# Patient Record
Sex: FEMALE | Race: WHITE | Employment: FULL TIME | URBAN - METROPOLITAN AREA
[De-identification: names, ages, dates, MRNs, and addresses within clinical notes are randomized per-mention and may not be internally consistent; named-entity substitution may affect disease eponyms.]

---

## 2017-04-11 RX ORDER — CYCLOBENZAPRINE HCL 10 MG
10 TABLET ORAL DAILY
Qty: 30 TABLET | Refills: 2 | Status: SHIPPED | OUTPATIENT
Start: 2017-04-11 | End: 2017-12-30

## 2017-04-12 RX ORDER — SITAGLIPTIN 50 MG/1
TABLET, FILM COATED ORAL
Qty: 60 TABLET | Refills: 3 | Status: SHIPPED | OUTPATIENT
Start: 2017-04-12 | End: 2017-11-02 | Stop reason: SDUPTHER

## 2017-06-09 DIAGNOSIS — E55.9 VITAMIN D DEFICIENCY: ICD-10-CM

## 2017-06-12 ENCOUNTER — HOSPITAL ENCOUNTER (OUTPATIENT)
Age: 37
Setting detail: SPECIMEN
Discharge: HOME | End: 2017-06-12
Payer: COMMERCIAL

## 2017-06-12 ENCOUNTER — OFFICE VISIT (OUTPATIENT)
Dept: FAMILY MEDICINE CLINIC | Age: 37
End: 2017-06-12
Payer: COMMERCIAL

## 2017-06-12 VITALS
DIASTOLIC BLOOD PRESSURE: 85 MMHG | BODY MASS INDEX: 36.32 KG/M2 | WEIGHT: 226 LBS | OXYGEN SATURATION: 97 % | RESPIRATION RATE: 16 BRPM | SYSTOLIC BLOOD PRESSURE: 126 MMHG | HEIGHT: 66 IN | HEART RATE: 83 BPM

## 2017-06-12 DIAGNOSIS — R06.02 SOB (SHORTNESS OF BREATH): ICD-10-CM

## 2017-06-12 DIAGNOSIS — J30.2 SEASONAL ALLERGIC RHINITIS, UNSPECIFIED ALLERGIC RHINITIS TRIGGER: ICD-10-CM

## 2017-06-12 LAB
-: NORMAL
ABSOLUTE EOS #: 0.1 K/UL (ref 0–0.4)
ABSOLUTE LYMPH #: 1.9 K/UL (ref 1–4.8)
ABSOLUTE MONO #: 0.3 K/UL (ref 0.1–1.2)
ALBUMIN SERPL-MCNC: 3.7 G/DL (ref 3.5–5.2)
ALBUMIN/GLOBULIN RATIO: 0.9 (ref 1–2.5)
ALP BLD-CCNC: 70 U/L (ref 35–104)
ALT SERPL-CCNC: 66 U/L (ref 5–33)
AMORPHOUS: NORMAL
ANION GAP SERPL CALCULATED.3IONS-SCNC: 15 MMOL/L (ref 9–17)
AST SERPL-CCNC: 52 U/L
BACTERIA: NORMAL
BASOPHILS # BLD: 1 %
BASOPHILS ABSOLUTE: 0 K/UL (ref 0–0.2)
BILIRUB SERPL-MCNC: 0.53 MG/DL (ref 0.3–1.2)
BILIRUBIN URINE: ABNORMAL
BUN BLDV-MCNC: 7 MG/DL (ref 6–20)
BUN/CREAT BLD: ABNORMAL (ref 9–20)
CALCIUM SERPL-MCNC: 9.2 MG/DL (ref 8.6–10.4)
CASTS UA: NORMAL /LPF (ref 0–8)
CHLORIDE BLD-SCNC: 104 MMOL/L (ref 98–107)
CHOLESTEROL/HDL RATIO: 3.8
CHOLESTEROL: 187 MG/DL
CO2: 19 MMOL/L (ref 20–31)
COLOR: ABNORMAL
COMMENT UA: ABNORMAL
CREAT SERPL-MCNC: 0.59 MG/DL (ref 0.5–0.9)
CREATININE URINE: 449.8 MG/DL (ref 28–217)
CRYSTALS, UA: NORMAL /HPF
DIFFERENTIAL TYPE: NORMAL
EOSINOPHILS RELATIVE PERCENT: 2 %
EPITHELIAL CELLS UA: NORMAL /HPF (ref 0–5)
GFR AFRICAN AMERICAN: >60 ML/MIN
GFR NON-AFRICAN AMERICAN: >60 ML/MIN
GFR SERPL CREATININE-BSD FRML MDRD: ABNORMAL ML/MIN/{1.73_M2}
GFR SERPL CREATININE-BSD FRML MDRD: ABNORMAL ML/MIN/{1.73_M2}
GLUCOSE BLD-MCNC: 221 MG/DL (ref 70–99)
GLUCOSE URINE: ABNORMAL
HBA1C MFR BLD: 9.3 %
HCT VFR BLD CALC: 40.5 % (ref 36–46)
HDLC SERPL-MCNC: 49 MG/DL
HEMOGLOBIN: 13.6 G/DL (ref 12–16)
KETONES, URINE: ABNORMAL
LDL CHOLESTEROL: 114 MG/DL (ref 0–130)
LEUKOCYTE ESTERASE, URINE: NEGATIVE
LYMPHOCYTES # BLD: 29 %
MCH RBC QN AUTO: 30.7 PG (ref 26–34)
MCHC RBC AUTO-ENTMCNC: 33.6 G/DL (ref 31–37)
MCV RBC AUTO: 91.5 FL (ref 80–100)
MICROALBUMIN/CREAT 24H UR: 49 MG/L
MICROALBUMIN/CREAT UR-RTO: 11 MCG/MG CREAT
MONOCYTES # BLD: 4 %
MUCUS: NORMAL
NITRITE, URINE: POSITIVE
OTHER OBSERVATIONS UA: NORMAL
PDW BLD-RTO: 13.8 % (ref 12.5–15.4)
PH UA: 5.5 (ref 5–8)
PLATELET # BLD: 280 K/UL (ref 140–450)
PLATELET ESTIMATE: NORMAL
PMV BLD AUTO: 9.2 FL (ref 6–12)
POTASSIUM SERPL-SCNC: 3.7 MMOL/L (ref 3.7–5.3)
PROTEIN UA: ABNORMAL
RBC # BLD: 4.43 M/UL (ref 4–5.2)
RBC # BLD: NORMAL 10*6/UL
RBC UA: NORMAL /HPF (ref 0–4)
RENAL EPITHELIAL, UA: NORMAL /HPF
SEG NEUTROPHILS: 64 %
SEGMENTED NEUTROPHILS ABSOLUTE COUNT: 4.2 K/UL (ref 1.8–7.7)
SODIUM BLD-SCNC: 138 MMOL/L (ref 135–144)
SPECIFIC GRAVITY UA: 1.04 (ref 1–1.03)
THYROXINE, FREE: 1.2 NG/DL (ref 0.93–1.7)
TOTAL PROTEIN: 7.6 G/DL (ref 6.4–8.3)
TRICHOMONAS: NORMAL
TRIGL SERPL-MCNC: 122 MG/DL
TSH SERPL DL<=0.05 MIU/L-ACNC: 0.95 MIU/L (ref 0.3–5)
TURBIDITY: ABNORMAL
URINE HGB: NEGATIVE
UROBILINOGEN, URINE: NORMAL
VLDLC SERPL CALC-MCNC: NORMAL MG/DL (ref 1–30)
WBC # BLD: 6.5 K/UL (ref 3.5–11)
WBC # BLD: NORMAL 10*3/UL
WBC UA: NORMAL /HPF (ref 0–5)
YEAST: NORMAL

## 2017-06-12 PROCEDURE — 99214 OFFICE O/P EST MOD 30 MIN: CPT | Performed by: FAMILY MEDICINE

## 2017-06-12 PROCEDURE — 83036 HEMOGLOBIN GLYCOSYLATED A1C: CPT | Performed by: FAMILY MEDICINE

## 2017-06-12 RX ORDER — ERGOCALCIFEROL 1.25 MG/1
CAPSULE ORAL
Qty: 4 CAPSULE | Refills: 2 | Status: SHIPPED | OUTPATIENT
Start: 2017-06-12 | End: 2017-11-02 | Stop reason: SDUPTHER

## 2017-06-12 RX ORDER — BLOOD-GLUCOSE METER
1 KIT MISCELLANEOUS DAILY
Qty: 1 KIT | Refills: 0 | Status: SHIPPED | OUTPATIENT
Start: 2017-06-12

## 2017-06-12 RX ORDER — GLUCOSAM/CHON-MSM1/C/MANG/BOSW 500-416.6
1 TABLET ORAL DAILY
Qty: 100 EACH | Refills: 3 | Status: SHIPPED | OUTPATIENT
Start: 2017-06-12 | End: 2018-04-24 | Stop reason: SDUPTHER

## 2017-06-12 RX ORDER — FLUTICASONE PROPIONATE 50 MCG
1 SPRAY, SUSPENSION (ML) NASAL DAILY
Qty: 1 BOTTLE | Refills: 3 | Status: SHIPPED | OUTPATIENT
Start: 2017-06-12 | End: 2018-04-24 | Stop reason: SDUPTHER

## 2017-06-12 RX ORDER — CETIRIZINE HYDROCHLORIDE 10 MG/1
10 TABLET ORAL DAILY
Qty: 30 TABLET | Refills: 2 | Status: SHIPPED | OUTPATIENT
Start: 2017-06-12 | End: 2017-11-02 | Stop reason: SDUPTHER

## 2017-06-14 LAB — THYROID PEROXIDASE (TPO) AB: 13.7 IU/ML (ref 0–35)

## 2017-06-20 ENCOUNTER — APPOINTMENT (OUTPATIENT)
Dept: CT IMAGING | Age: 37
End: 2017-06-20
Payer: COMMERCIAL

## 2017-06-20 ENCOUNTER — HOSPITAL ENCOUNTER (EMERGENCY)
Age: 37
Discharge: HOME | End: 2017-06-20
Attending: EMERGENCY MEDICINE
Payer: COMMERCIAL

## 2017-06-20 VITALS
TEMPERATURE: 98.4 F | WEIGHT: 226.5 LBS | HEART RATE: 84 BPM | HEIGHT: 66 IN | SYSTOLIC BLOOD PRESSURE: 138 MMHG | RESPIRATION RATE: 16 BRPM | BODY MASS INDEX: 36.4 KG/M2 | DIASTOLIC BLOOD PRESSURE: 68 MMHG | OXYGEN SATURATION: 97 %

## 2017-06-20 DIAGNOSIS — M79.10 MYALGIA: Primary | ICD-10-CM

## 2017-06-20 LAB
ABSOLUTE EOS #: 0.1 K/UL (ref 0–0.4)
ABSOLUTE LYMPH #: 2 K/UL (ref 1–4.8)
ABSOLUTE MONO #: 0.3 K/UL (ref 0.2–0.8)
ALBUMIN SERPL-MCNC: 3.6 G/DL (ref 3.5–5.2)
ALBUMIN/GLOBULIN RATIO: ABNORMAL (ref 1–2.5)
ALP BLD-CCNC: 53 U/L (ref 35–104)
ALT SERPL-CCNC: 51 U/L (ref 5–33)
ANION GAP SERPL CALCULATED.3IONS-SCNC: 14 MMOL/L (ref 9–17)
AST SERPL-CCNC: 45 U/L
BASOPHILS # BLD: 0 %
BASOPHILS ABSOLUTE: 0 K/UL (ref 0–0.2)
BILIRUB SERPL-MCNC: 0.33 MG/DL (ref 0.3–1.2)
BUN BLDV-MCNC: 10 MG/DL (ref 6–20)
BUN/CREAT BLD: 15 (ref 9–20)
CALCIUM SERPL-MCNC: 8.8 MG/DL (ref 8.6–10.4)
CHLORIDE BLD-SCNC: 103 MMOL/L (ref 98–107)
CO2: 19 MMOL/L (ref 20–31)
CREAT SERPL-MCNC: 0.67 MG/DL (ref 0.5–0.9)
D-DIMER QUANTITATIVE: 0.85 MG/L FEU
DIFFERENTIAL TYPE: NORMAL
EOSINOPHILS RELATIVE PERCENT: 1 %
GFR AFRICAN AMERICAN: >60 ML/MIN
GFR NON-AFRICAN AMERICAN: >60 ML/MIN
GFR SERPL CREATININE-BSD FRML MDRD: ABNORMAL ML/MIN/{1.73_M2}
GFR SERPL CREATININE-BSD FRML MDRD: ABNORMAL ML/MIN/{1.73_M2}
GLUCOSE BLD-MCNC: 161 MG/DL (ref 65–105)
GLUCOSE BLD-MCNC: 164 MG/DL (ref 70–99)
HCT VFR BLD CALC: 37.6 % (ref 36–46)
HEMOGLOBIN: 12.8 G/DL (ref 12–16)
INR BLD: 1
LYMPHOCYTES # BLD: 32 %
MCH RBC QN AUTO: 31.2 PG (ref 26–34)
MCHC RBC AUTO-ENTMCNC: 34 G/DL (ref 31–37)
MCV RBC AUTO: 91.8 FL (ref 80–100)
MONOCYTES # BLD: 6 %
PARTIAL THROMBOPLASTIN TIME: 25.4 SEC (ref 23–31)
PDW BLD-RTO: 12.9 % (ref 11.5–14.5)
PLATELET # BLD: 248 K/UL (ref 130–400)
PLATELET ESTIMATE: NORMAL
PMV BLD AUTO: 8.1 FL (ref 6–12)
POTASSIUM SERPL-SCNC: 3.7 MMOL/L (ref 3.7–5.3)
PROTHROMBIN TIME: 10.6 SEC (ref 9.7–11.6)
RBC # BLD: 4.1 M/UL (ref 4–5.2)
RBC # BLD: NORMAL 10*6/UL
SEG NEUTROPHILS: 61 %
SEGMENTED NEUTROPHILS ABSOLUTE COUNT: 3.8 K/UL (ref 1.8–7.7)
SODIUM BLD-SCNC: 136 MMOL/L (ref 135–144)
TOTAL PROTEIN: 6.9 G/DL (ref 6.4–8.3)
TROPONIN INTERP: NORMAL
TROPONIN T: <0.03 NG/ML
WBC # BLD: 6.2 K/UL (ref 3.5–11)
WBC # BLD: NORMAL 10*3/UL

## 2017-06-20 PROCEDURE — 99284 EMERGENCY DEPT VISIT MOD MDM: CPT

## 2017-06-20 PROCEDURE — 93971 EXTREMITY STUDY: CPT

## 2017-06-20 PROCEDURE — 85610 PROTHROMBIN TIME: CPT

## 2017-06-20 PROCEDURE — 85730 THROMBOPLASTIN TIME PARTIAL: CPT

## 2017-06-20 PROCEDURE — 85379 FIBRIN DEGRADATION QUANT: CPT

## 2017-06-20 PROCEDURE — 85025 COMPLETE CBC W/AUTO DIFF WBC: CPT

## 2017-06-20 PROCEDURE — 80053 COMPREHEN METABOLIC PANEL: CPT

## 2017-06-20 PROCEDURE — 82947 ASSAY GLUCOSE BLOOD QUANT: CPT

## 2017-06-20 PROCEDURE — 93005 ELECTROCARDIOGRAM TRACING: CPT

## 2017-06-20 PROCEDURE — 84484 ASSAY OF TROPONIN QUANT: CPT

## 2017-06-20 PROCEDURE — 70450 CT HEAD/BRAIN W/O DYE: CPT

## 2017-06-20 RX ORDER — OXYCODONE HYDROCHLORIDE AND ACETAMINOPHEN 5; 325 MG/1; MG/1
1-2 TABLET ORAL EVERY 6 HOURS PRN
Qty: 20 TABLET | Refills: 0 | Status: SHIPPED | OUTPATIENT
Start: 2017-06-20 | End: 2017-06-26 | Stop reason: ALTCHOICE

## 2017-06-20 ASSESSMENT — PAIN SCALES - GENERAL: PAINLEVEL_OUTOF10: 5

## 2017-06-20 ASSESSMENT — PAIN DESCRIPTION - ORIENTATION: ORIENTATION: LEFT

## 2017-06-21 LAB
EKG ATRIAL RATE: 92 BPM
EKG P AXIS: 40 DEGREES
EKG P-R INTERVAL: 132 MS
EKG Q-T INTERVAL: 362 MS
EKG QRS DURATION: 84 MS
EKG QTC CALCULATION (BAZETT): 447 MS
EKG R AXIS: 25 DEGREES
EKG T AXIS: 54 DEGREES
EKG VENTRICULAR RATE: 92 BPM

## 2017-06-26 ENCOUNTER — OFFICE VISIT (OUTPATIENT)
Dept: FAMILY MEDICINE CLINIC | Age: 37
End: 2017-06-26
Payer: COMMERCIAL

## 2017-06-26 VITALS
WEIGHT: 228 LBS | DIASTOLIC BLOOD PRESSURE: 83 MMHG | HEIGHT: 66 IN | RESPIRATION RATE: 14 BRPM | BODY MASS INDEX: 36.64 KG/M2 | HEART RATE: 94 BPM | SYSTOLIC BLOOD PRESSURE: 121 MMHG | OXYGEN SATURATION: 98 %

## 2017-06-26 PROCEDURE — 99213 OFFICE O/P EST LOW 20 MIN: CPT | Performed by: FAMILY MEDICINE

## 2017-06-26 RX ORDER — BLOOD-GLUCOSE METER
KIT MISCELLANEOUS
Refills: 0 | COMMUNITY
Start: 2017-06-13

## 2017-08-28 ENCOUNTER — OFFICE VISIT (OUTPATIENT)
Dept: FAMILY MEDICINE CLINIC | Age: 37
End: 2017-08-28
Payer: COMMERCIAL

## 2017-08-28 VITALS
HEIGHT: 66 IN | DIASTOLIC BLOOD PRESSURE: 79 MMHG | HEART RATE: 91 BPM | BODY MASS INDEX: 35.03 KG/M2 | OXYGEN SATURATION: 100 % | WEIGHT: 218 LBS | SYSTOLIC BLOOD PRESSURE: 103 MMHG

## 2017-08-28 PROCEDURE — 99213 OFFICE O/P EST LOW 20 MIN: CPT | Performed by: FAMILY MEDICINE

## 2017-11-01 ENCOUNTER — HOSPITAL ENCOUNTER (OUTPATIENT)
Age: 37
Setting detail: SPECIMEN
Discharge: HOME | End: 2017-11-01
Payer: COMMERCIAL

## 2017-11-01 ENCOUNTER — NURSE ONLY (OUTPATIENT)
Dept: FAMILY MEDICINE CLINIC | Age: 37
End: 2017-11-01
Payer: COMMERCIAL

## 2017-11-01 DIAGNOSIS — R10.9 FLANK PAIN: ICD-10-CM

## 2017-11-01 DIAGNOSIS — R30.0 DYSURIA: Primary | ICD-10-CM

## 2017-11-01 DIAGNOSIS — R39.9 UTI SYMPTOMS: ICD-10-CM

## 2017-11-01 LAB
BILIRUBIN URINE: NEGATIVE
BILIRUBIN, POC: NORMAL
BLOOD URINE, POC: NEGATIVE
CLARITY, POC: CLEAR
COLOR, POC: YELLOW
COLOR: YELLOW
COMMENT UA: ABNORMAL
GLUCOSE URINE, POC: ABNORMAL
GLUCOSE URINE: ABNORMAL
KETONES, POC: ABNORMAL
KETONES, URINE: ABNORMAL
LEUKOCYTE EST, POC: NEGATIVE
LEUKOCYTE ESTERASE, URINE: NEGATIVE
NITRITE, POC: NEGATIVE
NITRITE, URINE: NEGATIVE
PH UA: 5 (ref 5–8)
PH, POC: 5
PROTEIN UA: NEGATIVE
PROTEIN, POC: ABNORMAL
SPECIFIC GRAVITY UA: 1.02 (ref 1–1.03)
SPECIFIC GRAVITY, POC: 1.02
TURBIDITY: CLEAR
URINE HGB: NEGATIVE
UROBILINOGEN, POC: NORMAL
UROBILINOGEN, URINE: NORMAL

## 2017-11-01 PROCEDURE — 81002 URINALYSIS NONAUTO W/O SCOPE: CPT | Performed by: FAMILY MEDICINE

## 2017-11-02 LAB
CULTURE: NORMAL
CULTURE: NORMAL
Lab: NORMAL
SPECIMEN DESCRIPTION: NORMAL
STATUS: NORMAL

## 2017-12-30 ENCOUNTER — HOSPITAL ENCOUNTER (EMERGENCY)
Facility: CLINIC | Age: 37
Discharge: HOME | End: 2017-12-30
Attending: EMERGENCY MEDICINE
Payer: COMMERCIAL

## 2017-12-30 ENCOUNTER — APPOINTMENT (OUTPATIENT)
Dept: GENERAL RADIOLOGY | Facility: CLINIC | Age: 37
End: 2017-12-30
Payer: COMMERCIAL

## 2017-12-30 VITALS
OXYGEN SATURATION: 97 % | TEMPERATURE: 98.3 F | BODY MASS INDEX: 34.23 KG/M2 | HEIGHT: 66 IN | RESPIRATION RATE: 17 BRPM | HEART RATE: 91 BPM | WEIGHT: 213 LBS | SYSTOLIC BLOOD PRESSURE: 143 MMHG | DIASTOLIC BLOOD PRESSURE: 87 MMHG

## 2017-12-30 DIAGNOSIS — J20.9 ACUTE BRONCHITIS, UNSPECIFIED ORGANISM: Primary | ICD-10-CM

## 2017-12-30 LAB
BILIRUBIN URINE: NEGATIVE
COLOR: YELLOW
COMMENT UA: ABNORMAL
GLUCOSE URINE: ABNORMAL
KETONES, URINE: NEGATIVE
LEUKOCYTE ESTERASE, URINE: NEGATIVE
NITRITE, URINE: NEGATIVE
PH UA: 5.5 (ref 5–8)
PROTEIN UA: NEGATIVE
SPECIFIC GRAVITY UA: 1.04 (ref 1–1.03)
TURBIDITY: CLEAR
URINE HGB: NEGATIVE
UROBILINOGEN, URINE: NORMAL

## 2017-12-30 PROCEDURE — 71020 XR CHEST STANDARD TWO VW: CPT

## 2017-12-30 PROCEDURE — 87086 URINE CULTURE/COLONY COUNT: CPT

## 2017-12-30 PROCEDURE — 99283 EMERGENCY DEPT VISIT LOW MDM: CPT

## 2017-12-30 PROCEDURE — 81003 URINALYSIS AUTO W/O SCOPE: CPT

## 2017-12-30 RX ORDER — BENZONATATE 200 MG/1
200 CAPSULE ORAL 3 TIMES DAILY PRN
Qty: 30 CAPSULE | Refills: 0 | Status: SHIPPED | OUTPATIENT
Start: 2017-12-30 | End: 2018-01-06

## 2017-12-30 RX ORDER — AZITHROMYCIN 250 MG/1
TABLET, FILM COATED ORAL
Qty: 1 PACKET | Refills: 0 | Status: SHIPPED | OUTPATIENT
Start: 2017-12-30 | End: 2018-01-09

## 2017-12-30 RX ORDER — GUAIFENESIN 400 MG/1
400 TABLET ORAL 4 TIMES DAILY PRN
COMMUNITY
End: 2019-06-10

## 2017-12-30 ASSESSMENT — ENCOUNTER SYMPTOMS
RHINORRHEA: 1
EYE PAIN: 0
NAUSEA: 0
BACK PAIN: 0
COUGH: 1
ABDOMINAL PAIN: 0
BLOOD IN STOOL: 0
CONSTIPATION: 0
DIARRHEA: 0
VOMITING: 0
SHORTNESS OF BREATH: 0

## 2017-12-30 NOTE — ED PROVIDER NOTES
clots; and Vitamin D deficiency. SURGICAL HISTORY      has a past surgical history that includes Tubal ligation (03/2009); Tonsillectomy; Cervix lesion destruction (1998); and Hysterectomy (09/15/2016). CURRENT MEDICATIONS       Previous Medications    ALBUTEROL SULFATE (PROAIR RESPICLICK) 105 (90 BASE) MCG/ACT AEROSOL POWDER INHALATION    Inhale 1 puff into the lungs every 6 hours as needed for Wheezing or Shortness of Breath    BLOOD GLUCOSE MONITORING SUPPL (FREESTYLE LITE) EDUARDO        BLOOD GLUCOSE MONITORING SUPPL (TRUERESULT BLOOD GLUCOSE) W/DEVICE KIT    1 kit by Does not apply route daily    BYDUREON 2 MG SRER INJECTION    inject 1 SYRINGE INTO THE SKIN every week    CETIRIZINE (ZYRTEC) 10 MG TABLET    take 1 tablet by mouth once daily    DAPAGLIFLOZIN (FARXIGA) 10 MG TABLET    Take 1 tablet by mouth every morning    FLUTICASONE (FLONASE) 50 MCG/ACT NASAL SPRAY    1 spray by Nasal route daily    GLUCOSE BLOOD VI TEST STRIPS (TRUETEST TEST) STRIP    1 each by In Vitro route daily As needed. GUAIFENESIN 400 MG TABLET    Take 400 mg by mouth 4 times daily as needed for Cough    IBUPROFEN (ADVIL;MOTRIN) 800 MG TABLET    Take 1 tablet by mouth every 8 hours as needed for Pain    JANUVIA 50 MG TABLET    take 2 tablets by mouth once daily    LISINOPRIL (PRINIVIL;ZESTRIL) 5 MG TABLET    take 1 tablet by mouth once daily    METFORMIN (GLUCOPHAGE-XR) 500 MG EXTENDED RELEASE TABLET    take 2 tablets by mouth twice a day    SIMVASTATIN (ZOCOR) 40 MG TABLET    take 1 tablet by mouth every evening    TRUEPLUS LANCETS 30G MISC    1 each by Does not apply route daily    VITAMIN D (ERGOCALCIFEROL) 52083 UNITS CAPS CAPSULE    take 1 capsule by mouth every week       ALLERGIES     is allergic to codeine; keflex [cephalexin]; penicillins; and vicodin [hydrocodone-acetaminophen]. FAMILY HISTORY     indicated that the status of her mother is unknown.  She indicated that the status of her father is unknown.      family film images such as CT, Ultrasound and MRI are read by the radiologist. Plain radiographic images are visualized and the radiologist interpretations are reviewed as follows:      TWO VIEWS OF THE CHEST       12/30/2017 8:07 am       COMPARISON:   None.       HISTORY:   ORDERING SYSTEM PROVIDED HISTORY: cough   TECHNOLOGIST PROVIDED HISTORY:   Reason for exam:->cough   Ordering Physician Provided Reason for Exam: pt c/o cough x 2 weeks   Acuity: Acute   Type of Exam: Initial       FINDINGS:   Heart size is normal.  Mild bronchovascular prominence is present centrally   which can be clinically correlated for bronchitis.  No focal consolidation,   effusion, or pneumothorax is noted.  Osseous and mediastinal structures are   otherwise unremarkable.           Impression   Mild central bronchovascular prominence which can be clinically correlated   for bronchitis.  No localized consolidation is seen.               LABS:  Results for orders placed or performed during the hospital encounter of 12/30/17   Urinalysis   Result Value Ref Range    Color, UA YELLOW YEL    Turbidity UA CLEAR CLEAR    Glucose, Ur 3+ (A) NEG    Bilirubin Urine NEGATIVE NEG    Ketones, Urine NEGATIVE NEG    Specific Gravity, UA 1.036 (H) 1.005 - 1.030    Urine Hgb NEGATIVE NEG    pH, UA 5.5 5.0 - 8.0    Protein, UA NEGATIVE NEG    Urobilinogen, Urine Normal NORM    Nitrite, Urine NEGATIVE NEG    Leukocyte Esterase, Urine NEGATIVE NEG    Urinalysis Comments       Microscopic exam not performed based on chemical results unless requested in           [address] [place] [address] [place]:   Vitals:    Vitals:    12/30/17 0752   BP: (!) 143/87   Pulse: 91   Resp: 17   Temp: 98.3 °F (36.8 °C)   TempSrc: Oral   SpO2: 97%   Weight: 96.6 kg (213 lb)   Height: 5' 6\" (1.676 m)     -------------------------  BP: (!) 143/87, Temp: 98.3 °F (36.8 °C), Pulse: 91, Resp: 17          CONSULTS:      PROCEDURES:  None    FINAL IMPRESSION      1.  Acute bronchitis, unspecified

## 2017-12-30 NOTE — ED TRIAGE NOTES
Pt c/o cough and congestion for 2 weeks. Pt states she has also c/o UTI s/s. Pt took Mucinex and MDI Albuterol without effectiveness.

## 2017-12-31 LAB
CULTURE: NORMAL
CULTURE: NORMAL
Lab: NORMAL
SPECIMEN DESCRIPTION: NORMAL
SPECIMEN DESCRIPTION: NORMAL
STATUS: NORMAL

## 2018-04-24 ENCOUNTER — TELEPHONE (OUTPATIENT)
Dept: FAMILY MEDICINE CLINIC | Age: 38
End: 2018-04-24

## 2018-04-24 ENCOUNTER — OFFICE VISIT (OUTPATIENT)
Dept: FAMILY MEDICINE CLINIC | Age: 38
End: 2018-04-24
Payer: COMMERCIAL

## 2018-04-24 VITALS
SYSTOLIC BLOOD PRESSURE: 124 MMHG | DIASTOLIC BLOOD PRESSURE: 83 MMHG | RESPIRATION RATE: 16 BRPM | HEART RATE: 68 BPM | OXYGEN SATURATION: 98 % | BODY MASS INDEX: 35.99 KG/M2 | WEIGHT: 223 LBS

## 2018-04-24 DIAGNOSIS — E55.9 VITAMIN D DEFICIENCY: ICD-10-CM

## 2018-04-24 DIAGNOSIS — H61.22 IMPACTED CERUMEN OF LEFT EAR: ICD-10-CM

## 2018-04-24 DIAGNOSIS — J30.2 SEASONAL ALLERGIC RHINITIS, UNSPECIFIED CHRONICITY, UNSPECIFIED TRIGGER: ICD-10-CM

## 2018-04-24 DIAGNOSIS — Z00.01 ENCOUNTER FOR WELL ADULT EXAM WITH ABNORMAL FINDINGS: Primary | ICD-10-CM

## 2018-04-24 PROCEDURE — 1036F TOBACCO NON-USER: CPT | Performed by: FAMILY MEDICINE

## 2018-04-24 PROCEDURE — 3046F HEMOGLOBIN A1C LEVEL >9.0%: CPT | Performed by: FAMILY MEDICINE

## 2018-04-24 PROCEDURE — 2022F DILAT RTA XM EVC RTNOPTHY: CPT | Performed by: FAMILY MEDICINE

## 2018-04-24 PROCEDURE — 99395 PREV VISIT EST AGE 18-39: CPT | Performed by: FAMILY MEDICINE

## 2018-04-24 PROCEDURE — G8427 DOCREV CUR MEDS BY ELIG CLIN: HCPCS | Performed by: FAMILY MEDICINE

## 2018-04-24 PROCEDURE — G8417 CALC BMI ABV UP PARAM F/U: HCPCS | Performed by: FAMILY MEDICINE

## 2018-04-24 PROCEDURE — 69210 REMOVE IMPACTED EAR WAX UNI: CPT | Performed by: FAMILY MEDICINE

## 2018-04-24 PROCEDURE — 99214 OFFICE O/P EST MOD 30 MIN: CPT | Performed by: FAMILY MEDICINE

## 2018-04-24 RX ORDER — CETIRIZINE HYDROCHLORIDE 10 MG/1
10 TABLET ORAL DAILY
Qty: 30 TABLET | Refills: 3 | Status: SHIPPED | OUTPATIENT
Start: 2018-04-24 | End: 2019-09-30 | Stop reason: SDUPTHER

## 2018-04-24 RX ORDER — ERGOCALCIFEROL 1.25 MG/1
50000 CAPSULE ORAL WEEKLY
Qty: 4 CAPSULE | Refills: 2 | Status: SHIPPED | OUTPATIENT
Start: 2018-04-24 | End: 2019-06-10 | Stop reason: SDUPTHER

## 2018-04-24 RX ORDER — FLUCONAZOLE 150 MG/1
150 TABLET ORAL ONCE
Qty: 1 TABLET | Refills: 1 | Status: SHIPPED | OUTPATIENT
Start: 2018-04-24 | End: 2019-12-24

## 2018-04-24 RX ORDER — FLUTICASONE PROPIONATE 50 MCG
1 SPRAY, SUSPENSION (ML) NASAL DAILY
Qty: 1 BOTTLE | Refills: 3 | Status: SHIPPED | OUTPATIENT
Start: 2018-04-24

## 2018-04-24 RX ORDER — GLUCOSAM/CHON-MSM1/C/MANG/BOSW 500-416.6
1 TABLET ORAL DAILY
Qty: 100 EACH | Refills: 3 | Status: SHIPPED | OUTPATIENT
Start: 2018-04-24 | End: 2020-11-03 | Stop reason: SDUPTHER

## 2018-04-24 RX ORDER — LISINOPRIL 5 MG/1
2.5 TABLET ORAL DAILY
Qty: 15 TABLET | Refills: 3 | Status: SHIPPED | OUTPATIENT
Start: 2018-04-24 | End: 2019-06-10 | Stop reason: SDUPTHER

## 2018-04-24 RX ORDER — SIMVASTATIN 40 MG
40 TABLET ORAL NIGHTLY
Qty: 30 TABLET | Refills: 3 | Status: SHIPPED | OUTPATIENT
Start: 2018-04-24 | End: 2019-06-10 | Stop reason: SDUPTHER

## 2018-04-24 RX ORDER — METFORMIN HYDROCHLORIDE 500 MG/1
500 TABLET, EXTENDED RELEASE ORAL
Qty: 120 TABLET | Refills: 3 | Status: SHIPPED | OUTPATIENT
Start: 2018-04-24 | End: 2018-04-26 | Stop reason: DRUGHIGH

## 2018-04-24 ASSESSMENT — PATIENT HEALTH QUESTIONNAIRE - PHQ9
2. FEELING DOWN, DEPRESSED OR HOPELESS: 0
SUM OF ALL RESPONSES TO PHQ QUESTIONS 1-9: 0
SUM OF ALL RESPONSES TO PHQ9 QUESTIONS 1 & 2: 0
1. LITTLE INTEREST OR PLEASURE IN DOING THINGS: 0

## 2018-04-26 ENCOUNTER — HOSPITAL ENCOUNTER (OUTPATIENT)
Age: 38
Setting detail: SPECIMEN
Discharge: HOME | End: 2018-04-26
Payer: COMMERCIAL

## 2018-04-26 DIAGNOSIS — Z00.01 ENCOUNTER FOR WELL ADULT EXAM WITH ABNORMAL FINDINGS: ICD-10-CM

## 2018-04-26 LAB
ABSOLUTE EOS #: 0.09 K/UL (ref 0–0.44)
ABSOLUTE IMMATURE GRANULOCYTE: <0.03 K/UL (ref 0–0.3)
ABSOLUTE LYMPH #: 2.22 K/UL (ref 1.1–3.7)
ABSOLUTE MONO #: 0.36 K/UL (ref 0.1–1.2)
ALBUMIN SERPL-MCNC: 3.7 G/DL (ref 3.5–5.2)
ALBUMIN/GLOBULIN RATIO: 0.9 (ref 1–2.5)
ALP BLD-CCNC: 87 U/L (ref 35–104)
ALT SERPL-CCNC: 42 U/L (ref 5–33)
ANION GAP SERPL CALCULATED.3IONS-SCNC: 15 MMOL/L (ref 9–17)
AST SERPL-CCNC: 36 U/L
BASOPHILS # BLD: 0 % (ref 0–2)
BASOPHILS ABSOLUTE: <0.03 K/UL (ref 0–0.2)
BILIRUB SERPL-MCNC: 0.3 MG/DL (ref 0.3–1.2)
BILIRUBIN URINE: NEGATIVE
BUN BLDV-MCNC: 11 MG/DL (ref 6–20)
BUN/CREAT BLD: ABNORMAL (ref 9–20)
CALCIUM SERPL-MCNC: 8.8 MG/DL (ref 8.6–10.4)
CHLORIDE BLD-SCNC: 99 MMOL/L (ref 98–107)
CHOLESTEROL/HDL RATIO: 2.9
CHOLESTEROL: 167 MG/DL
CO2: 20 MMOL/L (ref 20–31)
COLOR: YELLOW
COMMENT UA: ABNORMAL
CREAT SERPL-MCNC: 0.57 MG/DL (ref 0.5–0.9)
DIFFERENTIAL TYPE: ABNORMAL
EOSINOPHILS RELATIVE PERCENT: 1 % (ref 1–4)
GFR AFRICAN AMERICAN: >60 ML/MIN
GFR NON-AFRICAN AMERICAN: >60 ML/MIN
GFR SERPL CREATININE-BSD FRML MDRD: ABNORMAL ML/MIN/{1.73_M2}
GFR SERPL CREATININE-BSD FRML MDRD: ABNORMAL ML/MIN/{1.73_M2}
GLUCOSE BLD-MCNC: 301 MG/DL (ref 70–99)
GLUCOSE URINE: ABNORMAL
HCT VFR BLD CALC: 38.2 % (ref 36.3–47.1)
HDLC SERPL-MCNC: 58 MG/DL
HEMOGLOBIN: 12.6 G/DL (ref 11.9–15.1)
IMMATURE GRANULOCYTES: 0 %
KETONES, URINE: NEGATIVE
LDL CHOLESTEROL: 96 MG/DL (ref 0–130)
LEUKOCYTE ESTERASE, URINE: NEGATIVE
LYMPHOCYTES # BLD: 28 % (ref 24–43)
MCH RBC QN AUTO: 31 PG (ref 25.2–33.5)
MCHC RBC AUTO-ENTMCNC: 33 G/DL (ref 28.4–34.8)
MCV RBC AUTO: 93.9 FL (ref 82.6–102.9)
MONOCYTES # BLD: 5 % (ref 3–12)
NITRITE, URINE: NEGATIVE
NRBC AUTOMATED: 0 PER 100 WBC
PDW BLD-RTO: 12.4 % (ref 11.8–14.4)
PH UA: 5 (ref 5–8)
PLATELET # BLD: 272 K/UL (ref 138–453)
PLATELET ESTIMATE: ABNORMAL
PMV BLD AUTO: 11.5 FL (ref 8.1–13.5)
POTASSIUM SERPL-SCNC: 3.9 MMOL/L (ref 3.7–5.3)
PROTEIN UA: NEGATIVE
RBC # BLD: 4.07 M/UL (ref 3.95–5.11)
RBC # BLD: ABNORMAL 10*6/UL
SEG NEUTROPHILS: 66 % (ref 36–65)
SEGMENTED NEUTROPHILS ABSOLUTE COUNT: 5.24 K/UL (ref 1.5–8.1)
SODIUM BLD-SCNC: 134 MMOL/L (ref 135–144)
SPECIFIC GRAVITY UA: 1.03 (ref 1–1.03)
THYROXINE, FREE: 1.23 NG/DL (ref 0.93–1.7)
TOTAL PROTEIN: 7.8 G/DL (ref 6.4–8.3)
TRIGL SERPL-MCNC: 65 MG/DL
TSH SERPL DL<=0.05 MIU/L-ACNC: 1.24 MIU/L (ref 0.3–5)
TURBIDITY: CLEAR
URINE HGB: NEGATIVE
UROBILINOGEN, URINE: NORMAL
VLDLC SERPL CALC-MCNC: NORMAL MG/DL (ref 1–30)
WBC # BLD: 8 K/UL (ref 3.5–11.3)
WBC # BLD: ABNORMAL 10*3/UL

## 2018-04-26 RX ORDER — METFORMIN HYDROCHLORIDE 500 MG/1
TABLET, EXTENDED RELEASE ORAL
Qty: 120 TABLET | Refills: 3 | Status: SHIPPED | OUTPATIENT
Start: 2018-04-26 | End: 2019-06-10 | Stop reason: SDUPTHER

## 2018-04-26 RX ORDER — PEN NEEDLE, DIABETIC 31 G X1/4"
1 NEEDLE, DISPOSABLE MISCELLANEOUS 2 TIMES DAILY
Qty: 100 EACH | Refills: 3 | Status: SHIPPED | OUTPATIENT
Start: 2018-04-26 | End: 2021-04-14

## 2018-04-27 LAB — THYROID PEROXIDASE (TPO) AB: 20.5 IU/ML (ref 0–35)

## 2018-05-03 ENCOUNTER — HOSPITAL ENCOUNTER (OUTPATIENT)
Age: 38
Setting detail: SPECIMEN
Discharge: HOME | End: 2018-05-03
Payer: COMMERCIAL

## 2018-05-03 ENCOUNTER — OFFICE VISIT (OUTPATIENT)
Dept: OBGYN CLINIC | Age: 38
End: 2018-05-03
Payer: COMMERCIAL

## 2018-05-03 VITALS
WEIGHT: 220 LBS | DIASTOLIC BLOOD PRESSURE: 81 MMHG | HEIGHT: 65 IN | HEART RATE: 90 BPM | BODY MASS INDEX: 36.65 KG/M2 | SYSTOLIC BLOOD PRESSURE: 127 MMHG

## 2018-05-03 DIAGNOSIS — Z12.31 ENCOUNTER FOR SCREENING MAMMOGRAM FOR MALIGNANT NEOPLASM OF BREAST: ICD-10-CM

## 2018-05-03 DIAGNOSIS — B37.31 VAGINAL YEAST INFECTION: ICD-10-CM

## 2018-05-03 DIAGNOSIS — Z80.41 FAMILY HISTORY OF OVARIAN CANCER: ICD-10-CM

## 2018-05-03 DIAGNOSIS — Z90.710 S/P HYSTERECTOMY: Primary | ICD-10-CM

## 2018-05-03 DIAGNOSIS — Z01.419 ENCOUNTER FOR WELL WOMAN EXAM: ICD-10-CM

## 2018-05-03 DIAGNOSIS — Z80.3 FAMILY HISTORY OF BREAST CANCER: ICD-10-CM

## 2018-05-03 LAB
DIRECT EXAM: ABNORMAL
Lab: ABNORMAL
SPECIMEN DESCRIPTION: ABNORMAL
STATUS: ABNORMAL

## 2018-05-03 PROCEDURE — 99395 PREV VISIT EST AGE 18-39: CPT | Performed by: ADVANCED PRACTICE MIDWIFE

## 2018-05-04 DIAGNOSIS — B96.89 BV (BACTERIAL VAGINOSIS): Primary | ICD-10-CM

## 2018-05-04 DIAGNOSIS — N76.0 BV (BACTERIAL VAGINOSIS): Primary | ICD-10-CM

## 2018-05-04 RX ORDER — METRONIDAZOLE 500 MG/1
500 TABLET ORAL 2 TIMES DAILY
Qty: 14 TABLET | Refills: 0 | Status: SHIPPED | OUTPATIENT
Start: 2018-05-04 | End: 2018-07-16 | Stop reason: SDUPTHER

## 2018-06-02 PROBLEM — Z01.419 ENCOUNTER FOR WELL WOMAN EXAM: Status: RESOLVED | Noted: 2018-05-03 | Resolved: 2018-06-02

## 2018-06-02 PROBLEM — Z12.31 ENCOUNTER FOR SCREENING MAMMOGRAM FOR MALIGNANT NEOPLASM OF BREAST: Status: RESOLVED | Noted: 2018-05-03 | Resolved: 2018-06-02

## 2018-07-16 DIAGNOSIS — N76.0 BV (BACTERIAL VAGINOSIS): ICD-10-CM

## 2018-07-16 DIAGNOSIS — B96.89 BV (BACTERIAL VAGINOSIS): ICD-10-CM

## 2018-07-16 RX ORDER — METRONIDAZOLE 500 MG/1
500 TABLET ORAL 2 TIMES DAILY
Qty: 14 TABLET | Refills: 0 | Status: SHIPPED | OUTPATIENT
Start: 2018-07-16 | End: 2018-10-15 | Stop reason: SDUPTHER

## 2018-09-11 ENCOUNTER — TELEPHONE (OUTPATIENT)
Dept: FAMILY MEDICINE CLINIC | Age: 38
End: 2018-09-11

## 2018-10-15 DIAGNOSIS — B96.89 BV (BACTERIAL VAGINOSIS): ICD-10-CM

## 2018-10-15 DIAGNOSIS — N76.0 BV (BACTERIAL VAGINOSIS): ICD-10-CM

## 2018-10-15 RX ORDER — METRONIDAZOLE 500 MG/1
500 TABLET ORAL 2 TIMES DAILY
Qty: 14 TABLET | Refills: 0 | Status: SHIPPED | OUTPATIENT
Start: 2018-10-15 | End: 2018-10-22

## 2019-06-10 ENCOUNTER — OFFICE VISIT (OUTPATIENT)
Dept: FAMILY MEDICINE CLINIC | Age: 39
End: 2019-06-10
Payer: COMMERCIAL

## 2019-06-10 VITALS
DIASTOLIC BLOOD PRESSURE: 86 MMHG | OXYGEN SATURATION: 98 % | SYSTOLIC BLOOD PRESSURE: 125 MMHG | WEIGHT: 218 LBS | HEART RATE: 100 BPM | BODY MASS INDEX: 36.28 KG/M2

## 2019-06-10 DIAGNOSIS — E55.9 VITAMIN D DEFICIENCY: ICD-10-CM

## 2019-06-10 LAB
AVERAGE GLUCOSE: NORMAL
HBA1C MFR BLD: 7.7 %

## 2019-06-10 PROCEDURE — 99213 OFFICE O/P EST LOW 20 MIN: CPT | Performed by: FAMILY MEDICINE

## 2019-06-10 RX ORDER — SIMVASTATIN 40 MG
40 TABLET ORAL NIGHTLY
Qty: 30 TABLET | Refills: 3 | Status: SHIPPED | OUTPATIENT
Start: 2019-06-10 | End: 2020-10-14 | Stop reason: SDUPTHER

## 2019-06-10 RX ORDER — LISINOPRIL 5 MG/1
2.5 TABLET ORAL DAILY
Qty: 15 TABLET | Refills: 3 | Status: SHIPPED | OUTPATIENT
Start: 2019-06-10 | End: 2020-10-14 | Stop reason: SDUPTHER

## 2019-06-10 RX ORDER — METFORMIN HYDROCHLORIDE 500 MG/1
TABLET, EXTENDED RELEASE ORAL
Qty: 120 TABLET | Refills: 3 | Status: SHIPPED | OUTPATIENT
Start: 2019-06-10 | End: 2020-10-14 | Stop reason: SDUPTHER

## 2019-06-10 RX ORDER — ERGOCALCIFEROL 1.25 MG/1
50000 CAPSULE ORAL WEEKLY
Qty: 4 CAPSULE | Refills: 2 | Status: SHIPPED | OUTPATIENT
Start: 2019-06-10 | End: 2019-10-26 | Stop reason: SDUPTHER

## 2019-06-10 ASSESSMENT — PATIENT HEALTH QUESTIONNAIRE - PHQ9
SUM OF ALL RESPONSES TO PHQ QUESTIONS 1-9: 0
1. LITTLE INTEREST OR PLEASURE IN DOING THINGS: 0
2. FEELING DOWN, DEPRESSED OR HOPELESS: 0
SUM OF ALL RESPONSES TO PHQ9 QUESTIONS 1 & 2: 0
SUM OF ALL RESPONSES TO PHQ QUESTIONS 1-9: 0

## 2019-06-10 NOTE — PROGRESS NOTES
General FM note    Julien Sheridan is a 44 y.o. female who presents today for follow up on her  medical conditions as noted below.   Julien Sheridan is c/o of   Chief Complaint   Patient presents with    1 Month Follow-Up       Patient Active Problem List:     Dysmenorrhea     AUB-N     Adenomyosis     s/p RALH w/BS     Uncontrolled type 2 diabetes mellitus without complication, with long-term current use of insulin ([address]     Vaginal yeast infection     Family history of breast cancer     Family history of ovarian cancer     Past Medical History:   Diagnosis Date    DM (diabetes mellitus) ([address] 2016    DUB (dysfunctional uterine bleeding)     adenomyosis    H/O menorrhagia     History of blood transfusion     no reaction    Hx of blood clots     2001 left leg    Vitamin D deficiency       Past Surgical History:   Procedure Laterality Date    CERVIX LESION DESTRUCTION  1998    Cryo    HYSTERECTOMY  09/15/2016    robotic total hyst with bilateral salpingo    TONSILLECTOMY      TUBAL LIGATION  03/2009     Family History   Problem Relation Age of Onset    Diabetes Mother     Stroke Mother     Heart Disease Mother     High Blood Pressure Mother     Diabetes Father     Stroke Father     Heart Disease Father      Current Outpatient Medications   Medication Sig Dispense Refill    simvastatin (ZOCOR) 40 MG tablet Take 1 tablet by mouth nightly 30 tablet 3    lisinopril (PRINIVIL;ZESTRIL) 5 MG tablet Take 0.5 tablets by mouth daily 15 tablet 3    SITagliptin (JANUVIA) 50 MG tablet take 2 tablets by mouth once daily 60 tablet 3    dapagliflozin (FARXIGA) 10 MG tablet Take 1 tablet by mouth every morning 30 tablet 3    vitamin D (ERGOCALCIFEROL) 04045 units CAPS capsule Take 1 capsule by mouth once a week 4 capsule 2    metFORMIN (GLUCOPHAGE-XR) 500 MG extended release tablet take 2 tablets by mouth twice a day 120 tablet 3    Insulin Pen Needle (PEN NEEDLES) 31G X 6 MM MISC 1 each by Does not apply route 2 times daily 100 each 3    insulin glargine (LANTUS) 100 UNIT/ML injection pen Inject 50 Units into the skin 2 times daily 5 pen 3    albuterol sulfate (PROAIR RESPICLICK) 894 (90 Base) MCG/ACT aerosol powder inhalation Inhale 1 puff into the lungs every 6 hours as needed for Wheezing or Shortness of Breath 1 Inhaler 0    cetirizine (ZYRTEC) 10 MG tablet Take 1 tablet by mouth daily 30 tablet 3    fluticasone (FLONASE) 50 MCG/ACT nasal spray 1 spray by Nasal route daily 1 Bottle 3    glucose blood VI test strips (TRUETEST TEST) strip 1 each by In Vitro route daily As needed. 100 each 3    TRUEPLUS LANCETS 30G MISC 1 each by Does not apply route daily 100 each 3    Blood Glucose Monitoring Suppl (FREESTYLE LITE) EDUARDO   0    Blood Glucose Monitoring Suppl (TRUERESULT BLOOD GLUCOSE) W/DEVICE KIT 1 kit by Does not apply route daily 1 kit 0    ibuprofen (ADVIL;MOTRIN) 800 MG tablet Take 1 tablet by mouth every 8 hours as needed for Pain 30 tablet 0     No current facility-administered medications for this visit. ALLERGIES:    Allergies   Allergen Reactions    Codeine Hives and Itching    Keflex [Cephalexin] Swelling    Penicillins Hives    Vicodin [Hydrocodone-Acetaminophen] Itching       Social History     Tobacco Use    Smoking status: Never Smoker    Smokeless tobacco: Never Used   Substance Use Topics    Alcohol use: Yes     Comment: rarely       Body mass index is 36.28 kg/m². /86   Pulse 100   Wt 218 lb (98.9 kg)   LMP 08/04/2016 (Exact Date)   SpO2 98%   BMI 36.28 kg/m²     Subjective:      HPI    43 yo female here for FU of her DMT2. Patient tells me that she only has been taking metformin. She also lost 13 pounds and she started to exercise at least 3 times a week. Diabetes Mellitus Type II, Follow-up: Patient here for follow-up of Type 2 diabetes mellitus. Current symptoms/problems include none and have been . Moises Hernandez Symptoms have been present for NA.     Known diabetic complications: none  Cardiovascular risk factors: diabetes mellitus and obesity (BMI >= 30 kg/m2)  Current diabetic medications include oral agent (monotherapy): metformin (generic). Eye exam current (within one year): yes  Weight trend: decreasing steadily  Prior visit with dietician: no  Current diet: well balanced, diabetic  Current exercise: walking    Current monitoring regimen: home blood tests - daily  Home blood sugar records: fasting range: 180  Any episodes of hypoglycemia? no    Is She on ACE inhibitor or angiotensin II receptor blocker? Yes   lisinopril (generic)  . Review of Systems   Constitutional: Negative for fever and unexpected weight change. Respiratory: Negative for cough and shortness of breath. Cardiovascular: Negative for chest pain and leg swelling. Gastrointestinal: Negative for diarrhea, constipation and blood in stool. Skin: Negative for color change and rash. Objective:   Physical Exam  Constitutional: VS (see above). General appearance: normal development, habitus and attention, no deformities. No distress. Eyes: normal conjunctiva and lids. CAV: RRR, no RMG. No edema lower extremities. Pulmo: CTA bilateral, no CWR. Skin: no rashes, lesions or ulcers. Musculoskeletal: normal gait. Nails: no clubbing or cyanosis. Psychiatric: alert and oriented to place, time and person. Normal mood and affect. A1c today 7.7. Assessment:       Diagnosis Orders   1. Uncontrolled type 2 diabetes mellitus without complication, without long-term current use of insulin (HCC)  simvastatin (ZOCOR) 40 MG tablet    lisinopril (PRINIVIL;ZESTRIL) 5 MG tablet    SITagliptin (JANUVIA) 50 MG tablet    dapagliflozin (FARXIGA) 10 MG tablet    metFORMIN (GLUCOPHAGE-XR) 500 MG extended release tablet   2. Vitamin D deficiency  vitamin D (ERGOCALCIFEROL) 55661 units CAPS capsule       Plan:   Patient will start additional medications. Her A1c needs to be below 6.5.   Patient will call with any questions. See me back in 3 months. Call or return to clinic prn if these symptoms worsen or fail to improve as anticipated. I have reviewed the instructions with the patient, answering all questions to her satisfaction. Return in about 3 months (around 9/10/2019), or if symptoms worsen or fail to improve, for DM II. No orders of the defined types were placed in this encounter. Orders Placed This Encounter   Medications    simvastatin (ZOCOR) 40 MG tablet     Sig: Take 1 tablet by mouth nightly     Dispense:  30 tablet     Refill:  3    lisinopril (PRINIVIL;ZESTRIL) 5 MG tablet     Sig: Take 0.5 tablets by mouth daily     Dispense:  15 tablet     Refill:  3    SITagliptin (JANUVIA) 50 MG tablet     Sig: take 2 tablets by mouth once daily     Dispense:  60 tablet     Refill:  3    dapagliflozin (FARXIGA) 10 MG tablet     Sig: Take 1 tablet by mouth every morning     Dispense:  30 tablet     Refill:  3    vitamin D (ERGOCALCIFEROL) 50039 units CAPS capsule     Sig: Take 1 capsule by mouth once a week     Dispense:  4 capsule     Refill:  2    metFORMIN (GLUCOPHAGE-XR) 500 MG extended release tablet     Sig: take 2 tablets by mouth twice a day     Dispense:  120 tablet     Refill:  3       Amber received counseling on the following healthy behaviors: nutrition, exercise and medication adherence  Reviewed prior labs and health maintenance  Continue current medications, diet and exercise. Discussed use, benefit, and side effects of prescribed medications. Barriers to medication compliance addressed. Patient given educational materials - see patient instructions  Was a self-tracking handout given in paper form or via [employer]?  No: .    Requested Prescriptions     Signed Prescriptions Disp Refills    simvastatin (ZOCOR) 40 MG tablet 30 tablet 3     Sig: Take 1 tablet by mouth nightly    lisinopril (PRINIVIL;ZESTRIL) 5 MG tablet 15 tablet 3     Sig: Take 0.5 tablets by mouth daily    SITagliptin (JANUVIA) 50 MG tablet 60 tablet 3     Sig: take 2 tablets by mouth once daily    dapagliflozin (FARXIGA) 10 MG tablet 30 tablet 3     Sig: Take 1 tablet by mouth every morning    vitamin D (ERGOCALCIFEROL) 92419 units CAPS capsule 4 capsule 2     Sig: Take 1 capsule by mouth once a week    metFORMIN (GLUCOPHAGE-XR) 500 MG extended release tablet 120 tablet 3     Sig: take 2 tablets by mouth twice a day       All patient questions answered. Patient voiced understanding. Quality Measures    Body mass index is 36.28 kg/m². Elevated. Weight control planned discussed daily exercise regimen and Healthy diet and regular exercise. BP: 125/86 Blood pressure is normal. Treatment plan consists of No treatment change needed.     Lab Results   Component Value Date    LDLCHOLESTEROL 96 04/26/2018    (goal LDL reduction with dx if diabetes is 50% LDL reduction)      PHQ Scores 6/10/2019 4/24/2018 7/26/2016   PHQ2 Score 0 0 0   PHQ9 Score 0 0 0     Interpretation of Total Score Depression Severity: 1-4 = Minimal depression, 5-9 = Mild depression, 10-14 = Moderate depression, 15-19 = Moderately severe depression, 20-27 = Severe depression     Electronically signed by Heraclio Salazar MD on 6/10/2019 at 2:35 PM       (Please note that portions of this note were completed with a voice recognition program. Efforts were made to edit the dictations but occasionally words are mis-transcribed.)

## 2019-06-10 NOTE — TELEPHONE ENCOUNTER
Pt states farxiga is Januvia is not covered for Januvia 50mg 2 tabs once a day but will cover Januvia 100mg once a day. Please change script, Med pended.

## 2019-07-18 ENCOUNTER — TELEPHONE (OUTPATIENT)
Dept: FAMILY MEDICINE CLINIC | Age: 39
End: 2019-07-18

## 2019-07-18 RX ORDER — METRONIDAZOLE 500 MG/1
500 TABLET ORAL 2 TIMES DAILY
Qty: 14 TABLET | Refills: 0 | Status: SHIPPED | OUTPATIENT
Start: 2019-07-18 | End: 2019-09-30 | Stop reason: SDUPTHER

## 2019-07-18 RX ORDER — FLUCONAZOLE 150 MG/1
150 TABLET ORAL
Qty: 2 TABLET | Refills: 0 | Status: SHIPPED | OUTPATIENT
Start: 2019-07-18 | End: 2019-07-22

## 2019-09-10 ENCOUNTER — TELEPHONE (OUTPATIENT)
Dept: FAMILY MEDICINE CLINIC | Age: 39
End: 2019-09-10

## 2019-09-30 DIAGNOSIS — J30.2 SEASONAL ALLERGIC RHINITIS: ICD-10-CM

## 2019-10-01 RX ORDER — METRONIDAZOLE 500 MG/1
TABLET ORAL
Qty: 14 TABLET | Refills: 0 | Status: SHIPPED | OUTPATIENT
Start: 2019-10-01 | End: 2019-12-24

## 2019-10-01 RX ORDER — CETIRIZINE HYDROCHLORIDE 10 MG/1
TABLET ORAL
Qty: 30 TABLET | Refills: 3 | Status: SHIPPED | OUTPATIENT
Start: 2019-10-01

## 2019-10-26 DIAGNOSIS — E55.9 VITAMIN D DEFICIENCY: ICD-10-CM

## 2019-10-28 RX ORDER — ERGOCALCIFEROL 1.25 MG/1
CAPSULE ORAL
Qty: 4 CAPSULE | Refills: 2 | Status: SHIPPED | OUTPATIENT
Start: 2019-10-28 | End: 2020-02-24

## 2019-12-24 RX ORDER — METRONIDAZOLE 500 MG/1
TABLET ORAL
Qty: 14 TABLET | Refills: 0 | Status: SHIPPED | OUTPATIENT
Start: 2019-12-24 | End: 2020-02-06

## 2019-12-24 RX ORDER — FLUCONAZOLE 150 MG/1
TABLET ORAL
Qty: 1 TABLET | Refills: 1 | Status: SHIPPED | OUTPATIENT
Start: 2019-12-24 | End: 2020-04-13

## 2020-02-06 RX ORDER — METRONIDAZOLE 500 MG/1
TABLET ORAL
Qty: 14 TABLET | Refills: 0 | Status: SHIPPED | OUTPATIENT
Start: 2020-02-06 | End: 2020-04-13

## 2020-02-24 RX ORDER — ERGOCALCIFEROL 1.25 MG/1
CAPSULE ORAL
Qty: 4 CAPSULE | Refills: 2 | Status: SHIPPED | OUTPATIENT
Start: 2020-02-24 | End: 2020-10-14 | Stop reason: SDUPTHER

## 2020-04-13 RX ORDER — FLUCONAZOLE 150 MG/1
TABLET ORAL
Qty: 1 TABLET | Refills: 1 | Status: SHIPPED | OUTPATIENT
Start: 2020-04-13 | End: 2020-12-28

## 2020-04-13 RX ORDER — METRONIDAZOLE 500 MG/1
TABLET ORAL
Qty: 14 TABLET | Refills: 0 | Status: SHIPPED | OUTPATIENT
Start: 2020-04-13 | End: 2020-08-11 | Stop reason: ALTCHOICE

## 2020-05-12 RX ORDER — METRONIDAZOLE 500 MG/1
TABLET ORAL
Qty: 14 TABLET | Refills: 0 | OUTPATIENT
Start: 2020-05-12

## 2020-08-11 ENCOUNTER — HOSPITAL ENCOUNTER (EMERGENCY)
Age: 40
Discharge: HOME | End: 2020-08-11
Attending: EMERGENCY MEDICINE
Payer: COMMERCIAL

## 2020-08-11 VITALS
HEIGHT: 66 IN | OXYGEN SATURATION: 99 % | TEMPERATURE: 98.8 F | WEIGHT: 216 LBS | BODY MASS INDEX: 34.72 KG/M2 | HEART RATE: 78 BPM | SYSTOLIC BLOOD PRESSURE: 155 MMHG | RESPIRATION RATE: 16 BRPM | DIASTOLIC BLOOD PRESSURE: 93 MMHG

## 2020-08-11 PROCEDURE — 99283 EMERGENCY DEPT VISIT LOW MDM: CPT

## 2020-08-11 PROCEDURE — 6370000000 HC RX 637 (ALT 250 FOR IP): Performed by: EMERGENCY MEDICINE

## 2020-08-11 RX ORDER — OXYCODONE HYDROCHLORIDE AND ACETAMINOPHEN 5; 325 MG/1; MG/1
1 TABLET ORAL EVERY 6 HOURS PRN
Qty: 10 TABLET | Refills: 0 | Status: SHIPPED | OUTPATIENT
Start: 2020-08-11 | End: 2020-08-14

## 2020-08-11 RX ORDER — IBUPROFEN 800 MG/1
800 TABLET ORAL EVERY 8 HOURS PRN
Qty: 30 TABLET | Refills: 1 | Status: SHIPPED | OUTPATIENT
Start: 2020-08-11 | End: 2021-01-29

## 2020-08-11 RX ORDER — CLINDAMYCIN HYDROCHLORIDE 300 MG/1
300 CAPSULE ORAL 4 TIMES DAILY
Qty: 28 CAPSULE | Refills: 0 | Status: SHIPPED | OUTPATIENT
Start: 2020-08-11 | End: 2020-08-18

## 2020-08-11 RX ORDER — CLINDAMYCIN HYDROCHLORIDE 150 MG/1
300 CAPSULE ORAL ONCE
Status: COMPLETED | OUTPATIENT
Start: 2020-08-11 | End: 2020-08-11

## 2020-08-11 RX ORDER — OXYCODONE HYDROCHLORIDE AND ACETAMINOPHEN 5; 325 MG/1; MG/1
2 TABLET ORAL ONCE
Status: COMPLETED | OUTPATIENT
Start: 2020-08-11 | End: 2020-08-11

## 2020-08-11 RX ADMIN — CLINDAMYCIN HYDROCHLORIDE 300 MG: 150 CAPSULE ORAL at 19:32

## 2020-08-11 RX ADMIN — OXYCODONE HYDROCHLORIDE AND ACETAMINOPHEN 2 TABLET: 5; 325 TABLET ORAL at 19:32

## 2020-08-11 ASSESSMENT — ENCOUNTER SYMPTOMS
SHORTNESS OF BREATH: 0
NAUSEA: 0
DIARRHEA: 0
RHINORRHEA: 0
VOMITING: 0
SORE THROAT: 0
COLOR CHANGE: 0
COUGH: 0
EYE DISCHARGE: 0
EYE REDNESS: 0
FACIAL SWELLING: 1

## 2020-08-11 ASSESSMENT — PAIN SCALES - GENERAL
PAINLEVEL_OUTOF10: 10
PAINLEVEL_OUTOF10: 8

## 2020-08-11 NOTE — ED PROVIDER NOTES
EMERGENCY DEPARTMENT ENCOUNTER    Pt Name: Radha Yousif  MRN: [id number]  [place] 1980  Date of evaluation: 8/11/20  CHIEF COMPLAINT       Chief Complaint   Patient presents with    Sinusitis     x 2 days    Headache     HISTORY OF PRESENT ILLNESS   This is a 49-year-old female that presents with complaints of right-sided facial pain and discomfort. Patient states that over the past few days she is had increasing pain discomfort in the right upper jaw, aggravated by chewing without any alleviating factors. She states that she now has some pain radiating up into her right face and into the right ear. She denies any fevers or chills, she has no chest pain or shortness of breath. She describes her symptoms as severe. REVIEW OF SYSTEMS     Review of Systems   Constitutional: Negative for chills and fever. HENT: Positive for dental problem and facial swelling. Negative for rhinorrhea and sore throat. Eyes: Negative for discharge, redness and visual disturbance. Respiratory: Negative for cough and shortness of breath. Cardiovascular: Negative for chest pain, palpitations and leg swelling. Gastrointestinal: Negative for diarrhea, nausea and vomiting. Musculoskeletal: Negative for arthralgias, myalgias and neck pain. Skin: Negative for color change and rash. Neurological: Positive for headaches. Negative for seizures and weakness. Psychiatric/Behavioral: Negative for hallucinations, self-injury and suicidal ideas.      PASTMEDICAL HISTORY     Past Medical History:   Diagnosis Date    DM (diabetes mellitus) ([facility] 2016    DUB (dysfunctional uterine bleeding)     adenomyosis    H/O menorrhagia     History of blood transfusion     no reaction    Hx of blood clots     2001 left leg    Vitamin D deficiency      Past Problem List  Patient Active Problem List   Diagnosis Code    Dysmenorrhea N94.6    AUB-N N93.9    Adenomyosis N80.9    s/p OPAL w/BS Z90.710    Uncontrolled type 2 diabetes mellitus without complication, with long-term current use of insulin (HCC) E11.65, Z79.4    Vaginal yeast infection B37.3    Family history of breast cancer Z80.3    Family history of ovarian cancer Z80.41     SURGICAL HISTORY       Past Surgical History:   Procedure Laterality Date    CERVIX LESION DESTRUCTION  1998    Cryo    HYSTERECTOMY  09/15/2016    robotic total hyst with bilateral salpingo    TONSILLECTOMY      TUBAL LIGATION  03/2009     CURRENT MEDICATIONS       Previous Medications    ALBUTEROL SULFATE (PROAIR RESPICLICK) 219 (90 BASE) MCG/ACT AEROSOL POWDER INHALATION    Inhale 1 puff into the lungs every 6 hours as needed for Wheezing or Shortness of Breath    BLOOD GLUCOSE MONITORING SUPPL (FREESTYLE LITE) EDUARDO        BLOOD GLUCOSE MONITORING SUPPL (TRUERESULT BLOOD GLUCOSE) W/DEVICE KIT    1 kit by Does not apply route daily    CETIRIZINE (ZYRTEC) 10 MG TABLET    take 1 tablet by mouth once daily    DAPAGLIFLOZIN (FARXIGA) 10 MG TABLET    Take 1 tablet by mouth every morning    FLUCONAZOLE (DIFLUCAN) 150 MG TABLET    take 1 tablet by mouth as a single dose    FLUTICASONE (FLONASE) 50 MCG/ACT NASAL SPRAY    1 spray by Nasal route daily    GLUCOSE BLOOD VI TEST STRIPS (TRUETEST TEST) STRIP    1 each by In Vitro route daily As needed.     INSULIN GLARGINE (LANTUS) 100 UNIT/ML INJECTION PEN    Inject 50 Units into the skin 2 times daily    INSULIN PEN NEEDLE (PEN NEEDLES) 31G X 6 MM MISC    1 each by Does not apply route 2 times daily    LISINOPRIL (PRINIVIL;ZESTRIL) 5 MG TABLET    Take 0.5 tablets by mouth daily    METFORMIN (GLUCOPHAGE-XR) 500 MG EXTENDED RELEASE TABLET    take 2 tablets by mouth twice a day    SIMVASTATIN (ZOCOR) 40 MG TABLET    Take 1 tablet by mouth nightly    SITAGLIPTIN (JANUVIA) 100 MG TABLET    Take 1 tablet by mouth daily    TRUEPLUS LANCETS 30G MISC    1 each by Does not apply route daily    VITAMIN D (ERGOCALCIFEROL) 1.25 MG (57796 UT) CAPS CAPSULE take 1 capsule by mouth every week     ALLERGIES     is allergic to codeine; keflex [cephalexin]; penicillins; and vicodin [hydrocodone-acetaminophen]. FAMILY HISTORY     She indicated that the status of her mother is unknown. She indicated that the status of her father is unknown. SOCIAL HISTORY       Social History     Tobacco Use    Smoking status: Never Smoker    Smokeless tobacco: Never Used   Substance Use Topics    Alcohol use: Yes     Comment: rarely     Drug use: No     PHYSICAL EXAM     INITIAL VITALS: BP (!) 155/93   Pulse 78   Temp 98.8 °F (37.1 °C)   Resp 16   Ht 5' 6\" (1.676 m)   Wt 216 lb (98 kg)   LMP 08/04/2016 (Exact Date)   SpO2 99%   BMI 34.86 kg/m²    Physical Exam  Constitutional:       Appearance: Normal appearance. She is well-developed. She is not ill-appearing or toxic-appearing. HENT:      Head: Normocephalic and atraumatic. Right Ear: Tympanic membrane and ear canal normal.      Mouth/Throat:      Dentition: Abnormal dentition. Dental tenderness and dental caries present. Eyes:      Conjunctiva/sclera: Conjunctivae normal.      Pupils: Pupils are equal, round, and reactive to light. Neck:      Musculoskeletal: Normal range of motion and neck supple. Trachea: Trachea normal.   Cardiovascular:      Rate and Rhythm: Normal rate and regular rhythm. Heart sounds: S1 normal and S2 normal. No murmur. Pulmonary:      Effort: Pulmonary effort is normal. No accessory muscle usage or respiratory distress. Breath sounds: Normal breath sounds. Chest:      Chest wall: No deformity or tenderness. Abdominal:      General: Bowel sounds are normal. There is no distension or abdominal bruit. Palpations: Abdomen is not rigid. Tenderness: There is no abdominal tenderness. There is no guarding or rebound. Skin:     General: Skin is warm. Findings: No rash. Neurological:      Mental Status: She is alert and oriented to person, place, and time. GCS: GCS eye subscore is 4. GCS verbal subscore is 5. GCS motor subscore is 6. Psychiatric:         Speech: Speech normal.         MEDICAL DECISION MAKIN-year-old female presents with complaints of right-sided dental infection, plan is clindamycin, pain control and outpatient follow-up. CRITICAL CARE:       PROCEDURES:    Procedures    DIAGNOSTIC RESULTS   EKG:All EKG's are interpreted by the Emergency Department Physician who either signs or Co-signs this chart in the absence of a cardiologist.        RADIOLOGY:All plain film, CT, MRI, and formal ultrasound images (except ED bedside ultrasound) are read by the radiologist, see reports below, unless otherwisenoted in MDM or here. No orders to display     LABS: All lab results were reviewed by myself, and all abnormals are listed below. Labs Reviewed - No data to display    EMERGENCY DEPARTMENTCOURSE:         Vitals:    Vitals:    20 1831 20 1832   BP:  (!) 155/93   Pulse:  78   Resp:  16   Temp:  98.8 °F (37.1 °C)   SpO2:  99%   Weight: 216 lb (98 kg)    Height: 5' 6\" (1.676 m)        The patient was given the following medications while in the emergency department:  Orders Placed This Encounter   Medications    clindamycin (CLEOCIN) capsule 300 mg    clindamycin (CLEOCIN) 300 MG capsule     Sig: Take 1 capsule by mouth 4 times daily for 7 days     Dispense:  28 capsule     Refill:  0    oxyCODONE-acetaminophen (PERCOCET) 5-325 MG per tablet 2 tablet    oxyCODONE-acetaminophen (PERCOCET) 5-325 MG per tablet     Sig: Take 1 tablet by mouth every 6 hours as needed for Pain for up to 3 days. WARNING:  May cause drowsiness. May impair ability to operate vehicles or machinery. Do not use in combination with alcohol.      Dispense:  10 tablet     Refill:  0    ibuprofen (IBU) 800 MG tablet     Sig: Take 1 tablet by mouth every 8 hours as needed for Pain     Dispense:  30 tablet     Refill:  1     CONSULTS:  None    FINAL IMPRESSION      1. Dental infection    2. Acute nonintractable headache, unspecified headache type          DISPOSITION/PLAN   DISPOSITION Decision To Discharge 08/11/2020 07:16:21 PM      PATIENT REFERRED TO:  No follow-up provider specified. DISCHARGE MEDICATIONS:  New Prescriptions    CLINDAMYCIN (CLEOCIN) 300 MG CAPSULE    Take 1 capsule by mouth 4 times daily for 7 days    IBUPROFEN (IBU) 800 MG TABLET    Take 1 tablet by mouth every 8 hours as needed for Pain    OXYCODONE-ACETAMINOPHEN (PERCOCET) 5-325 MG PER TABLET    Take 1 tablet by mouth every 6 hours as needed for Pain for up to 3 days. WARNING:  May cause drowsiness. May impair ability to operate vehicles or machinery. Do not use in combination with alcohol.      Jamarcus Melendez MD  Attending Emergency Physician                   Jamarcus Melendez MD  08/11/20 Bella Winkler

## 2020-08-11 NOTE — LETTER
[facility]  [address] [id number]  Phone: [phone]    No name on file. August 11, 4623     Patient: Ashish Park   YOB: 1980   Date of Visit: 8/11/2020       To Whom It May Concern: It is my medical opinion that Merlyn Elizalde may return to work on 8/12/2020. If you have any questions or concerns, please don't hesitate to call.     Sincerely,

## 2020-10-14 ENCOUNTER — TELEMEDICINE (OUTPATIENT)
Dept: FAMILY MEDICINE CLINIC | Age: 40
End: 2020-10-14
Payer: COMMERCIAL

## 2020-10-14 PROCEDURE — 99214 OFFICE O/P EST MOD 30 MIN: CPT | Performed by: FAMILY MEDICINE

## 2020-10-14 RX ORDER — LISINOPRIL 5 MG/1
2.5 TABLET ORAL DAILY
Qty: 15 TABLET | Refills: 3 | Status: SHIPPED | OUTPATIENT
Start: 2020-10-14 | End: 2021-08-09

## 2020-10-14 RX ORDER — METFORMIN HYDROCHLORIDE 500 MG/1
TABLET, EXTENDED RELEASE ORAL
Qty: 120 TABLET | Refills: 3 | Status: SHIPPED | OUTPATIENT
Start: 2020-10-14 | End: 2021-07-06

## 2020-10-14 RX ORDER — SIMVASTATIN 40 MG
40 TABLET ORAL NIGHTLY
Qty: 30 TABLET | Refills: 3 | Status: SHIPPED | OUTPATIENT
Start: 2020-10-14 | End: 2021-10-08

## 2020-10-14 RX ORDER — ERGOCALCIFEROL 1.25 MG/1
50000 CAPSULE ORAL WEEKLY
Qty: 4 CAPSULE | Refills: 2 | Status: SHIPPED | OUTPATIENT
Start: 2020-10-14 | End: 2021-04-14

## 2020-10-14 RX ORDER — GLYBURIDE 5 MG/1
5 TABLET ORAL 2 TIMES DAILY WITH MEALS
Qty: 60 TABLET | Refills: 1 | Status: SHIPPED | OUTPATIENT
Start: 2020-10-14 | End: 2021-01-29 | Stop reason: ALTCHOICE

## 2020-10-14 ASSESSMENT — PATIENT HEALTH QUESTIONNAIRE - PHQ9
2. FEELING DOWN, DEPRESSED OR HOPELESS: 0
1. LITTLE INTEREST OR PLEASURE IN DOING THINGS: 0
SUM OF ALL RESPONSES TO PHQ QUESTIONS 1-9: 0
SUM OF ALL RESPONSES TO PHQ9 QUESTIONS 1 & 2: 0
SUM OF ALL RESPONSES TO PHQ QUESTIONS 1-9: 0

## 2020-10-14 NOTE — PROGRESS NOTES
General FM note    Harpreet Tillman is a 36 y.o. female who presents today for follow up on her  medical conditions as noted below. Harpreet Tillman is c/o of No chief complaint on file.       Patient Active Problem List:     Dysmenorrhea     AUB-N     Adenomyosis     s/p RALH w/BS     Uncontrolled type 2 diabetes mellitus without complication, with long-term current use of insulin     Vaginal yeast infection     Family history of breast cancer     Family history of ovarian cancer     Past Medical History:   Diagnosis Date    DM (diabetes mellitus) ([address] 2016    DUB (dysfunctional uterine bleeding)     adenomyosis    H/O menorrhagia     History of blood transfusion     no reaction    Hx of blood clots     2001 left leg    Vitamin D deficiency       Past Surgical History:   Procedure Laterality Date    CERVIX LESION DESTRUCTION  1998    [place]    HYSTERECTOMY  09/15/2016    robotic total hyst with bilateral salpingo    TONSILLECTOMY      TUBAL LIGATION  03/2009     Family History   Problem Relation Age of Onset    Diabetes Mother     Stroke Mother     Heart Disease Mother     High Blood Pressure Mother     Diabetes Father     Stroke Father     Heart Disease Father      Current Outpatient Medications   Medication Sig Dispense Refill    exenatide (BYDUREON) 2 MG SRER injection Inject 1 Syringe into the skin once a week 4 each 2    glyBURIDE (DIABETA) 5 MG tablet Take 1 tablet by mouth 2 times daily (with meals) 60 tablet 1    vitamin D (ERGOCALCIFEROL) 1.25 MG (37117 UT) CAPS capsule Take 1 capsule by mouth once a week 4 capsule 2    metFORMIN (GLUCOPHAGE-XR) 500 MG extended release tablet take 2 tablets by mouth twice a day 120 tablet 3    lisinopril (PRINIVIL;ZESTRIL) 5 MG tablet Take 0.5 tablets by mouth daily 15 tablet 3    simvastatin (ZOCOR) 40 MG tablet Take 1 tablet by mouth nightly 30 tablet 3    ibuprofen (IBU) 800 MG tablet Take 1 tablet by mouth every 8 hours as needed for Pain 30 tablet 1    cetirizine (ZYRTEC) 10 MG tablet take 1 tablet by mouth once daily 30 tablet 3    albuterol sulfate (PROAIR RESPICLICK) 039 (90 Base) MCG/ACT aerosol powder inhalation Inhale 1 puff into the lungs every 6 hours as needed for Wheezing or Shortness of Breath 1 Inhaler 0    TRUEPLUS LANCETS 30G MISC 1 each by Does not apply route daily 100 each 3    fluconazole (DIFLUCAN) 150 MG tablet take 1 tablet by mouth as a single dose (Patient not taking: Reported on 10/14/2020) 1 tablet 1    dapagliflozin (FARXIGA) 10 MG tablet Take 1 tablet by mouth every morning (Patient not taking: Reported on 10/14/2020) 30 tablet 3    SITagliptin (JANUVIA) 100 MG tablet Take 1 tablet by mouth daily (Patient not taking: Reported on 10/14/2020) 30 tablet 5    Insulin Pen Needle (PEN NEEDLES) 31G X 6 MM MISC 1 each by Does not apply route 2 times daily (Patient not taking: Reported on 10/14/2020) 100 each 3    insulin glargine (LANTUS) 100 UNIT/ML injection pen Inject 50 Units into the skin 2 times daily (Patient not taking: Reported on 10/14/2020) 5 pen 3    fluticasone (FLONASE) 50 MCG/ACT nasal spray 1 spray by Nasal route daily (Patient not taking: Reported on 10/14/2020) 1 Bottle 3    glucose blood VI test strips (TRUETEST TEST) strip 1 each by In Vitro route daily As needed. (Patient not taking: Reported on 10/14/2020) 100 each 3    Blood Glucose Monitoring Suppl (FREESTYLE LITE) EDUARDO   0    Blood Glucose Monitoring Suppl (TRUERESULT BLOOD GLUCOSE) W/DEVICE KIT 1 kit by Does not apply route daily (Patient not taking: Reported on 10/14/2020) 1 kit 0     No current facility-administered medications for this visit.       ALLERGIES:    Allergies   Allergen Reactions    Codeine Hives and Itching    Keflex [Cephalexin] Swelling    Penicillins Hives    Vicodin [Hydrocodone-Acetaminophen] Itching       Social History     Tobacco Use    Smoking status: Never Smoker    Smokeless tobacco: Never Used   Substance Use Topics    Alcohol use: Yes     Comment: rarely       There is no height or weight on file to calculate BMI. LMP 08/04/2016 (Exact Date)     Subjective:      HPI    37 yo female reaching out per tele med visit today. Insurance lapsed. Was not able to see me.  08/2019 A1C: 7.7. was taken off insulin. Tells me since the pandemia she has been working a lot and not eating the right diet. Gained weight. Fasting glucose levels 230 on average. On metformin only. [place] and [place] and [place] making her stomach turn. She feels very tiered overall. Complains about L side lower back discomfort, in the middle more walking it swells up. At times she has discomfort radiating pain to the L ankle and to the foot. No falls or any incidents. Review of Systems   Constitutional: Negative for fever and unexpected weight change. + Fatigue. Respiratory: Negative for cough and shortness of breath. Cardiovascular: Negative for chest pain and leg swelling. Gastrointestinal: Negative for diarrhea, constipation and blood in stool. Musculoskeletal: Negative for back pain and gait problem. Skin: Negative for color change and rash. Neurological: Negative for dizziness and headaches. Psychiatric/Behavioral: Negative for confusion and agitation. Objective:   Physical Exam  General appearance: normal development, habitus and attention, no deformities. No distress. Pulmo: normal breathing pattern. Psychiatric: alert and oriented to place, time and person. Normal mood and affect. Assessment:       Diagnosis Orders   1. Uncontrolled type 2 diabetes mellitus with hyperglycemia (HCC)  exenatide (BYDUREON) 2 MG SRER injection    glyBURIDE (DIABETA) 5 MG tablet    metFORMIN (GLUCOPHAGE-XR) 500 MG extended release tablet    CBC Auto Differential    Urinalysis    TSH with Reflex    Comprehensive Metabolic Panel   2. Vitamin D deficiency  vitamin D (ERGOCALCIFEROL) 1.25 MG (87069 UT) CAPS capsule    Vitamin D 25 Hydroxy   3.  Diabetes mellitus type 2 in obese (HCC)  lisinopril (PRINIVIL;ZESTRIL) 5 MG tablet    simvastatin (ZOCOR) 40 MG tablet    CBC Auto Differential    Urinalysis    TSH with Reflex    Comprehensive Metabolic Panel    Hemoglobin A1C    Lipid Panel    Microalbumin, Ur   4. Encounter for screening mammogram for malignant neoplasm of breast  BRANDI DIGITAL SCREEN W OR WO CAD BILATERAL   5. Need for hepatitis C screening test  Hepatitis C Antibody   6. Sciatic leg pain  XR LUMBAR SPINE (2-3 VIEWS)       Plan:   See me back in the office. Call or return to clinic prn if these symptoms worsen or fail to improve as anticipated. I have reviewed the instructions with the patient, answering all questions to ehr satisfaction. Ruma Andrew is a 36 y.o. female being evaluated by a Virtual Visit (video visit) encounter to address concerns as mentioned above. A caregiver was present when appropriate. Due to this being a TeleHealth encounter ([facility]), evaluation of the following organ systems was limited: Vitals/Constitutional/EENT/Resp/CV/GI//MS/Neuro/Skin/Heme-Lymph-Imm. Pursuant to the emergency declaration under the [facility], [address] [place] [address] authority and the [employer] and [employer] Act, this Virtual Visit was conducted with patient's (and/or legal guardian's) consent, to reduce the patient's risk of exposure to COVID-19 and provide necessary medical care. The patient (and/or legal guardian) has also been advised to contact this office for worsening conditions or problems, and seek emergency medical treatment and/or call 911 if deemed necessary. Patient identification was verified at the start of the visit: Yes    Total time spent for this encounter: Not billed by time    Services were provided through a video synchronous discussion virtually to substitute for in-person clinic visit.  Patient and provider were located at

## 2020-10-28 ENCOUNTER — PATIENT MESSAGE (OUTPATIENT)
Dept: FAMILY MEDICINE CLINIC | Age: 40
End: 2020-10-28

## 2020-10-28 NOTE — TELEPHONE ENCOUNTER
From: Mark Jin  To: Madi Busch MD  Sent: 10/28/2020 3:13 PM EDT  Subject: Non-Urgent Harmeet Dobbins Grandchild, I have a question, I've been sick for about a week, no fever, but I have a bad cough, trouble breathing, very tired and weak at times, sinus congestion to the point of my sense of smell is gone, feels just like the sinus infection and bronchitis I get almost every year about this time and I was wondering if I could have something called in or would I need to do a visit.  I did just get tested for Covid Tuesday, we get tested every week at work, and that was negative, so I know it's not that thankfully

## 2020-10-29 ENCOUNTER — TELEPHONE (OUTPATIENT)
Dept: FAMILY MEDICINE CLINIC | Age: 40
End: 2020-10-29

## 2020-10-29 RX ORDER — CLARITHROMYCIN 500 MG/1
500 TABLET, COATED ORAL 2 TIMES DAILY
Qty: 14 TABLET | Refills: 0 | Status: SHIPPED | OUTPATIENT
Start: 2020-10-29 | End: 2020-11-05

## 2020-10-29 NOTE — TELEPHONE ENCOUNTER
Just supportive measures. Use Motrin or Tylenol if you have fevers push fluids. Thank you. If not getting better and if the breathing gets worse please go to the ER. Thank you.

## 2020-10-29 NOTE — TELEPHONE ENCOUNTER
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 Pt c/o Cough, SOB, fatigue and loss of smell, no fever and wants to know if something can be prescribed.

## 2020-11-04 RX ORDER — GLUCOSAM/CHON-MSM1/C/MANG/BOSW 500-416.6
1 TABLET ORAL DAILY
Qty: 100 EACH | Refills: 3 | Status: SHIPPED | OUTPATIENT
Start: 2020-11-04 | End: 2021-05-04 | Stop reason: SDUPTHER

## 2020-11-04 RX ORDER — BIOTIN 1 MG
1 TABLET ORAL DAILY
Qty: 100 EACH | Refills: 3 | Status: SHIPPED | OUTPATIENT
Start: 2020-11-04 | End: 2021-11-01

## 2020-11-04 NOTE — TELEPHONE ENCOUNTER
Leandro Mcmanus is calling to request a refill on the following medication(s):    Last Visit Date (If Applicable):      Next Visit Date:    12/3/2020    Medication Request:  Requested Prescriptions     Pending Prescriptions Disp Refills    blood glucose test strips (TRUETEST TEST) strip 100 each 3     Si each by In Vitro route daily As needed.     TRUEplus Lancets 30G MISC 100 each 3     Si each by Does not apply route daily

## 2020-12-28 ENCOUNTER — PATIENT MESSAGE (OUTPATIENT)
Dept: FAMILY MEDICINE CLINIC | Age: 40
End: 2020-12-28

## 2020-12-29 NOTE — TELEPHONE ENCOUNTER
From: Prasad Pascual  To: Dilip Tello MD  Sent: 12/28/2020 3:59 PM EST  Subject: Prescription Question    Hello, I had requested from the pharmacy an antibiotic but I believe it was the wrong one, the request was denied, they said to call you if it was still needed which it is, but like I said I believe I requested the wrong one, so I was wondering if I could have the flagyl I think it is called in, I have the bacterial/yeast infection going on that I get periodically because of my diabetes. The pill for the yeast infection has already been filled so I just need something for the bacterial infection.  Thank you so much

## 2021-01-29 ENCOUNTER — HOSPITAL ENCOUNTER (EMERGENCY)
Age: 41
Discharge: HOME | End: 2021-01-29
Attending: EMERGENCY MEDICINE
Payer: COMMERCIAL

## 2021-01-29 VITALS
WEIGHT: 217 LBS | SYSTOLIC BLOOD PRESSURE: 147 MMHG | HEIGHT: 66 IN | OXYGEN SATURATION: 98 % | HEART RATE: 82 BPM | RESPIRATION RATE: 16 BRPM | TEMPERATURE: 98.1 F | BODY MASS INDEX: 34.87 KG/M2 | DIASTOLIC BLOOD PRESSURE: 106 MMHG

## 2021-01-29 DIAGNOSIS — K08.89 ODONTALGIA: Primary | ICD-10-CM

## 2021-01-29 PROCEDURE — 6370000000 HC RX 637 (ALT 250 FOR IP): Performed by: EMERGENCY MEDICINE

## 2021-01-29 PROCEDURE — 99285 EMERGENCY DEPT VISIT HI MDM: CPT

## 2021-01-29 RX ORDER — ERYTHROMYCIN 250 MG/1
500 TABLET, COATED ORAL 3 TIMES DAILY
Qty: 42 TABLET | Refills: 0 | Status: SHIPPED | OUTPATIENT
Start: 2021-01-29 | End: 2021-02-05

## 2021-01-29 RX ORDER — ERYTHROMYCIN ETHYLSUCCINATE 400 MG/1
400 TABLET ORAL
Status: DISCONTINUED | OUTPATIENT
Start: 2021-01-29 | End: 2021-01-29

## 2021-01-29 RX ORDER — CLINDAMYCIN HYDROCHLORIDE 150 MG/1
300 CAPSULE ORAL ONCE
Status: COMPLETED | OUTPATIENT
Start: 2021-01-29 | End: 2021-01-29

## 2021-01-29 RX ORDER — IBUPROFEN 600 MG/1
600 TABLET ORAL ONCE
Status: COMPLETED | OUTPATIENT
Start: 2021-01-29 | End: 2021-01-29

## 2021-01-29 RX ORDER — GLYBURIDE 5 MG/1
5 TABLET ORAL 2 TIMES DAILY WITH MEALS
COMMUNITY
End: 2021-02-01

## 2021-01-29 RX ORDER — IBUPROFEN 600 MG/1
600 TABLET ORAL EVERY 6 HOURS PRN
Qty: 30 TABLET | Refills: 0 | Status: SHIPPED | OUTPATIENT
Start: 2021-01-29

## 2021-01-29 RX ADMIN — CLINDAMYCIN HYDROCHLORIDE 300 MG: 150 CAPSULE ORAL at 19:36

## 2021-01-29 RX ADMIN — IBUPROFEN 600 MG: 600 TABLET, FILM COATED ORAL at 19:33

## 2021-01-29 ASSESSMENT — PAIN SCALES - GENERAL: PAINLEVEL_OUTOF10: 3

## 2021-01-29 ASSESSMENT — ENCOUNTER SYMPTOMS
ALLERGIC/IMMUNOLOGIC NEGATIVE: 1
GASTROINTESTINAL NEGATIVE: 1
RESPIRATORY NEGATIVE: 1
EYES NEGATIVE: 1

## 2021-01-30 NOTE — ED PROVIDER NOTES
eMERGENCY dEPARTMENT eNCOUnter      Pt Name: Comfort Del Cid  MRN: [id number]  [place] 1980  Date of evaluation: 1/29/2021      CHIEF COMPLAINT       Chief Complaint   Patient presents with    Facial Swelling     right sided, onset yesterday. patient sts she has a couple broken teeth and sts she has a dentist appt in 2 weeks          HISTORY OF PRESENT ILLNESS    Comfort Del Cid is a 36 y.o. female who presents to the emergency department for right-sided facial swelling that she says is been there for couple of days she is got broken tooth at that site so she knows she needs to see a dentist that appointment is in a several weeks. Patient is afebrile nontoxic looking swelling is minimal at this point time. REVIEW OF SYSTEMS       Review of Systems   Constitutional: Negative. HENT: Positive for dental problem. Eyes: Negative. Respiratory: Negative. Cardiovascular: Negative. Gastrointestinal: Negative. Endocrine: Negative. Genitourinary: Negative. Musculoskeletal: Negative. Skin: Negative. Allergic/Immunologic: Negative. Neurological: Negative. Hematological: Negative. Psychiatric/Behavioral: Negative. PAST MEDICAL HISTORY    has a past medical history of DM (diabetes mellitus) ([facility] [address] DUB (dysfunctional uterine bleeding), H/O menorrhagia, History of blood transfusion, Hx of blood clots, and Vitamin D deficiency. SURGICAL HISTORY      has a past surgical history that includes Tubal ligation (03/2009); Tonsillectomy; Cervix lesion destruction (1998); and Hysterectomy (09/15/2016).     CURRENT MEDICATIONS       Previous Medications    ALBUTEROL SULFATE (PROAIR RESPICLICK) 584 (90 BASE) MCG/ACT AEROSOL POWDER INHALATION    Inhale 1 puff into the lungs every 6 hours as needed for Wheezing or Shortness of Breath    BLOOD GLUCOSE MONITORING SUPPL (FREESTYLE LITE) EDUARDO        BLOOD GLUCOSE MONITORING SUPPL (TRUERESULT BLOOD GLUCOSE) W/DEVICE KIT    1 kit by Does not apply route daily    BLOOD GLUCOSE TEST STRIPS (TRUETEST TEST) STRIP    1 each by In Vitro route daily As needed. CETIRIZINE (ZYRTEC) 10 MG TABLET    take 1 tablet by mouth once daily    FLUTICASONE (FLONASE) 50 MCG/ACT NASAL SPRAY    1 spray by Nasal route daily    GLYBURIDE (DIABETA) 5 MG TABLET    Take 5 mg by mouth 2 times daily (with meals)    INSULIN GLARGINE (LANTUS) 100 UNIT/ML INJECTION PEN    Inject 50 Units into the skin 2 times daily    INSULIN PEN NEEDLE (PEN NEEDLES) 31G X 6 MM MISC    1 each by Does not apply route 2 times daily    LISINOPRIL (PRINIVIL;ZESTRIL) 5 MG TABLET    Take 0.5 tablets by mouth daily    METFORMIN (GLUCOPHAGE-XR) 500 MG EXTENDED RELEASE TABLET    take 2 tablets by mouth twice a day    SIMVASTATIN (ZOCOR) 40 MG TABLET    Take 1 tablet by mouth nightly    TRUEPLUS LANCETS 30G MISC    1 each by Does not apply route daily    VITAMIN D (ERGOCALCIFEROL) 1.25 MG (21343 UT) CAPS CAPSULE    Take 1 capsule by mouth once a week       ALLERGIES     is allergic to codeine; keflex [cephalexin]; penicillins; and vicodin [hydrocodone-acetaminophen]. FAMILY HISTORY     She indicated that the status of her mother is unknown. She indicated that the status of her father is unknown.     family history includes Diabetes in her father and mother; Heart Disease in her father and mother; High Blood Pressure in her mother; Stroke in her father and mother. SOCIAL HISTORY      reports that she has never smoked. She has never used smokeless tobacco. She reports current alcohol use. She reports that she does not use drugs. PHYSICAL EXAM     INITIAL VITALS:  height is 5' 6\" (1.676 m) and weight is 98.4 kg (217 lb). Her oral temperature is 98.1 °F (36.7 °C). Her blood pressure is 147/106 (abnormal) and her pulse is 82. Her respiration is 16 and oxygen saturation is 98%.      Constitutional: Alert, oriented x3, nontoxic, afebrile, answering questions appropriately, acting properly for age, in no acute distress  HEENT: Extraocular muscles intact, mucus membranes moist, TMs clear bilaterally, no posterior pharyngeal erythema or exudates, Pupils equal, round, reactive to light, . Examination of the oropharynx does reveal very poor dentition in the right upper side mostly the first second and third molars. There is no sign of abscess there is no redness there is no drainage. Neck: Trachea midline, Supple without lymphadenopathy, no posterior midline neck tenderness to palpation  Cardiovascular: Regular rhythm and rate no S3, S4, or murmurs  Respiratory: Clear to auscultation bilaterally no wheezes, rhonchi, rales, no respiratory distress  Gastrointestinal: Soft, nontender, nondistended, positive bowel sounds. No rebound, rigidity, or guarding. Musculoskeletal: No extremity pain or swelling  Neurologic: Moving all 4 extremities without difficulty there are no gross focal neurologic deficits  Skin: Warm and dry      DIFFERENTIAL DIAGNOSIS/ MDM:     Acute odontalgia with extremely poor dentition. Patient will need to have several of these teeth which are decayed down to the gumline removed. DIAGNOSTIC RESULTS     EKG: All EKG's are interpreted by the Emergency Department Physician who either signs or Co-signs this chart in the absence of a cardiologist.        Not indicated unless otherwise documented above    LABS:  No results found for this visit on 01/29/21.     Not indicated unless otherwise documented above    RADIOLOGY:   I reviewed the radiologist interpretations:  No orders to display       Not indicated unless otherwise documented above    EMERGENCY DEPARTMENT COURSE:     The patient was given the following medications:  Orders Placed This Encounter   Medications    ibuprofen (ADVIL;MOTRIN) tablet 600 mg    DISCONTD: erythromycin ethylsuccinate (EES) tablet 400 mg    clindamycin (CLEOCIN) capsule 300 mg     Order Specific Question:   Antimicrobial Indications     Answer:   Head and Neck Infection  ibuprofen (ADVIL;MOTRIN) 600 MG tablet     Sig: Take 1 tablet by mouth every 6 hours as needed for Pain     Dispense:  30 tablet     Refill:  0    erythromycin base (E-MYCIN) 250 MG tablet     Sig: Take 2 tablets by mouth 3 times daily for 7 days     Dispense:  42 tablet     Refill:  0        Vitals:    Vitals:    01/29/21 1907   BP: (!) 147/106   Pulse: 82   Resp: 16   Temp: 98.1 °F (36.7 °C)   TempSrc: Oral   SpO2: 98%   Weight: 98.4 kg (217 lb)   Height: 5' 6\" (1.676 m)     -------------------------  BP (!) 147/106   Pulse 82   Temp 98.1 °F (36.7 °C) (Oral)   Resp 16   Ht 5' 6\" (1.676 m)   Wt 98.4 kg (217 lb)   LMP 08/04/2016 (Exact Date)   SpO2 98%   BMI 35.02 kg/m²         I have reviewed the disposition diagnosis with the patient and or their family/guardian. I have answered their questions and given discharge instructions. They voiced understanding of these instructions and did not have any further questions or complaints. CRITICAL CARE:    None    CONSULTS:    None    PROCEDURES:    None      OARRS Report if indicated             FINAL IMPRESSION      1. Odontalgia          DISPOSITION/PLAN   DISPOSITION Decision To Discharge    I have reviewed the disposition diagnosis with the patient and or their family/guardian. I have answered their questions and given discharge instructions. They voiced understanding of these instructions and did not have any further questions or complaints. Reevaluation: Patient has been gotten an antibiotic here in the department had some ibuprofen I we will send her home on antibiotics and ibuprofen for pain she is to follow-up with a dentist in 1 to 2 days that she is in need of dental extraction and extensive dental work.       PATIENT REFERRED TO:    Dentist in 2 days          DISCHARGE MEDICATIONS:  New Prescriptions    ERYTHROMYCIN BASE (E-MYCIN) 250 MG TABLET    Take 2 tablets by mouth 3 times daily for 7 days    IBUPROFEN (ADVIL;MOTRIN) 600 MG TABLET

## 2021-02-12 ENCOUNTER — TELEPHONE (OUTPATIENT)
Dept: FAMILY MEDICINE CLINIC | Age: 41
End: 2021-02-12

## 2021-02-23 ENCOUNTER — TELEPHONE (OUTPATIENT)
Dept: FAMILY MEDICINE CLINIC | Age: 41
End: 2021-02-23

## 2021-03-08 ENCOUNTER — OFFICE VISIT (OUTPATIENT)
Dept: FAMILY MEDICINE CLINIC | Age: 41
End: 2021-03-08
Payer: COMMERCIAL

## 2021-03-08 VITALS
HEIGHT: 66 IN | SYSTOLIC BLOOD PRESSURE: 135 MMHG | OXYGEN SATURATION: 96 % | BODY MASS INDEX: 35.74 KG/M2 | DIASTOLIC BLOOD PRESSURE: 86 MMHG | WEIGHT: 222.4 LBS | HEART RATE: 91 BPM | TEMPERATURE: 96.9 F

## 2021-03-08 DIAGNOSIS — Z00.01 ENCOUNTER FOR WELL ADULT EXAM WITH ABNORMAL FINDINGS: Primary | ICD-10-CM

## 2021-03-08 DIAGNOSIS — E11.65 UNCONTROLLED TYPE 2 DIABETES MELLITUS WITH HYPERGLYCEMIA (HCC): ICD-10-CM

## 2021-03-08 DIAGNOSIS — M54.30 SCIATIC LEG PAIN: ICD-10-CM

## 2021-03-08 DIAGNOSIS — L71.9 ACNE ROSACEA: ICD-10-CM

## 2021-03-08 LAB — HBA1C MFR BLD: 7.8 %

## 2021-03-08 PROCEDURE — 99213 OFFICE O/P EST LOW 20 MIN: CPT | Performed by: FAMILY MEDICINE

## 2021-03-08 PROCEDURE — G8484 FLU IMMUNIZE NO ADMIN: HCPCS | Performed by: FAMILY MEDICINE

## 2021-03-08 PROCEDURE — 1036F TOBACCO NON-USER: CPT | Performed by: FAMILY MEDICINE

## 2021-03-08 PROCEDURE — 3051F HG A1C>EQUAL 7.0%<8.0%: CPT | Performed by: FAMILY MEDICINE

## 2021-03-08 PROCEDURE — G8427 DOCREV CUR MEDS BY ELIG CLIN: HCPCS | Performed by: FAMILY MEDICINE

## 2021-03-08 PROCEDURE — 2022F DILAT RTA XM EVC RTNOPTHY: CPT | Performed by: FAMILY MEDICINE

## 2021-03-08 PROCEDURE — 83036 HEMOGLOBIN GLYCOSYLATED A1C: CPT | Performed by: FAMILY MEDICINE

## 2021-03-08 PROCEDURE — G8417 CALC BMI ABV UP PARAM F/U: HCPCS | Performed by: FAMILY MEDICINE

## 2021-03-08 PROCEDURE — 99396 PREV VISIT EST AGE 40-64: CPT | Performed by: FAMILY MEDICINE

## 2021-03-08 RX ORDER — METRONIDAZOLE 7.5 MG/G
GEL TOPICAL
Qty: 45 G | Refills: 1 | Status: SHIPPED | OUTPATIENT
Start: 2021-03-08 | End: 2021-08-10

## 2021-03-08 RX ORDER — SEMAGLUTIDE 1.34 MG/ML
0.5 INJECTION, SOLUTION SUBCUTANEOUS WEEKLY
Qty: 2 PEN | Refills: 1 | Status: SHIPPED | OUTPATIENT
Start: 2021-03-08 | End: 2021-09-08

## 2021-03-08 SDOH — ECONOMIC STABILITY: INCOME INSECURITY: HOW HARD IS IT FOR YOU TO PAY FOR THE VERY BASICS LIKE FOOD, HOUSING, MEDICAL CARE, AND HEATING?: NOT HARD AT ALL

## 2021-03-08 SDOH — ECONOMIC STABILITY: TRANSPORTATION INSECURITY
IN THE PAST 12 MONTHS, HAS LACK OF TRANSPORTATION KEPT YOU FROM MEETINGS, WORK, OR FROM GETTING THINGS NEEDED FOR DAILY LIVING?: NOT ASKED

## 2021-03-08 SDOH — ECONOMIC STABILITY: TRANSPORTATION INSECURITY
IN THE PAST 12 MONTHS, HAS THE LACK OF TRANSPORTATION KEPT YOU FROM MEDICAL APPOINTMENTS OR FROM GETTING MEDICATIONS?: NOT ASKED

## 2021-03-08 SDOH — ECONOMIC STABILITY: FOOD INSECURITY: WITHIN THE PAST 12 MONTHS, THE FOOD YOU BOUGHT JUST DIDN'T LAST AND YOU DIDN'T HAVE MONEY TO GET MORE.: NEVER TRUE

## 2021-03-08 ASSESSMENT — PATIENT HEALTH QUESTIONNAIRE - PHQ9
SUM OF ALL RESPONSES TO PHQ QUESTIONS 1-9: 0
SUM OF ALL RESPONSES TO PHQ9 QUESTIONS 1 & 2: 0
SUM OF ALL RESPONSES TO PHQ QUESTIONS 1-9: 0
2. FEELING DOWN, DEPRESSED OR HOPELESS: 0

## 2021-03-08 NOTE — PROGRESS NOTES
Subjective:       Soco Henry is a 39 y.o. female and is here for a comprehensive physical exam.  The patient reports problems -     T2DM: always takes metformin. The patient is not on Lantus anymore after her last A1c was quite low. Not eating a diabetic diet. At work grab and go. Again the patient does not eat a diabetic diet. Works at [employer] now. Very active during the day. Complains about lower back hurts really bad. L sciatic nerve - constantly tingling. raditing to her heel. Exercises tried.      History:  Any STD's in the past? none  Past Medical History:   Diagnosis Date    DM (diabetes mellitus) ([address] 2016    DUB (dysfunctional uterine bleeding)     adenomyosis    H/O menorrhagia     History of blood transfusion     no reaction    Hx of blood clots     2001 left leg    Vitamin D deficiency      Patient Active Problem List    Diagnosis Date Noted    s/p OPAL lazo/CLARENCE 09/15/2016     Priority: Medium    Vaginal yeast infection 05/03/2018    Family history of breast cancer 05/03/2018    Family history of ovarian cancer 05/03/2018    Uncontrolled type 2 diabetes mellitus without complication, with long-term current use of insulin 08/28/2017    Dysmenorrhea 09/14/2016    AUB-N 09/14/2016    Adenomyosis 09/14/2016     Past Surgical History:   Procedure Laterality Date    CERVIX LESION DESTRUCTION  1998    Cryo    HYSTERECTOMY  09/15/2016    robotic total hyst with bilateral salpingo    TONSILLECTOMY      TUBAL LIGATION  03/2009     Family History   Problem Relation Age of Onset    Diabetes Mother     Stroke Mother     Heart Disease Mother     High Blood Pressure Mother     Diabetes Father     Stroke Father     Heart Disease Father      Social History     Socioeconomic History    Marital status: Single     Spouse name: None    Number of children: None    Years of education: None    Highest education level: None   Occupational History    None   Social Needs    Financial resource strain: Not hard at all   [employer] insecurity     Worry: Never true     Inability: Never true   Spredfast needs     Medical: None     Non-medical: None   Tobacco Use    Smoking status: Never Smoker    Smokeless tobacco: Never Used   Substance and Sexual Activity    Alcohol use: Yes     Comment: rarely     Drug use: No    Sexual activity: None   Lifestyle    Physical activity     Days per week: None     Minutes per session: None    Stress: None   Relationships    Social connections     Talks on phone: None     Gets together: None     Attends Orthodox service: None     Active member of club or organization: None     Attends meetings of clubs or organizations: None     Relationship status: None    Intimate partner violence     Fear of current or ex partner: None     Emotionally abused: None     Physically abused: None     Forced sexual activity: None   Other Topics Concern    None   Social History Narrative    None     Current Outpatient Medications   Medication Sig Dispense Refill    metroNIDAZOLE (METROGEL) 0.75 % gel Apply topically 2 times daily. 45 g 1    Semaglutide,0.25 or 0.5MG/DOS, (OZEMPIC, 0.25 OR 0.5 MG/DOSE,) 2 MG/1.5ML SOPN Inject 0.5 mg into the skin once a week Start with 0.25 mg weekly for 4 weeks. 2 pen 1    Blood Glucose Monitoring Suppl (TRUE METRIX METER) w/Device KIT 1 each by Does not apply route 3 times daily 1 kit 1    glyBURIDE (DIABETA) 5 MG tablet take 1 tablet by mouth twice a day with meals 60 tablet 2    ibuprofen (ADVIL;MOTRIN) 600 MG tablet Take 1 tablet by mouth every 6 hours as needed for Pain 30 tablet 0    blood glucose test strips (TRUETEST TEST) strip 1 each by In Vitro route daily As needed.  100 each 3    TRUEplus Lancets 30G MISC 1 each by Does not apply route daily 100 each 3    vitamin D (ERGOCALCIFEROL) 1.25 MG (90046 UT) CAPS capsule Take 1 capsule by mouth once a week 4 capsule 2    metFORMIN (GLUCOPHAGE-XR) 500 MG extended release tablet take 2 tablets by mouth twice a day (Patient taking differently: Take 1,000 mg by mouth 2 times daily (with meals) take 2 tablets by mouth twice a day) 120 tablet 3    lisinopril (PRINIVIL;ZESTRIL) 5 MG tablet Take 0.5 tablets by mouth daily 15 tablet 3    simvastatin (ZOCOR) 40 MG tablet Take 1 tablet by mouth nightly 30 tablet 3    cetirizine (ZYRTEC) 10 MG tablet take 1 tablet by mouth once daily 30 tablet 3    Insulin Pen Needle (PEN NEEDLES) 31G X 6 MM MISC 1 each by Does not apply route 2 times daily 100 each 3    insulin glargine (LANTUS) 100 UNIT/ML injection pen Inject 50 Units into the skin 2 times daily 5 pen 3    albuterol sulfate (PROAIR RESPICLICK) 933 (90 Base) MCG/ACT aerosol powder inhalation Inhale 1 puff into the lungs every 6 hours as needed for Wheezing or Shortness of Breath 1 Inhaler 0    fluticasone (FLONASE) 50 MCG/ACT nasal spray 1 spray by Nasal route daily 1 Bottle 3    Blood Glucose Monitoring Suppl (FREESTYLE LITE) EDUARDO   0    Blood Glucose Monitoring Suppl (TRUERESULT BLOOD GLUCOSE) W/DEVICE KIT 1 kit by Does not apply route daily 1 kit 0     No current facility-administered medications for this visit. Current Outpatient Medications on File Prior to Visit   Medication Sig Dispense Refill    Blood Glucose Monitoring Suppl (TRUE METRIX METER) w/Device KIT 1 each by Does not apply route 3 times daily 1 kit 1    glyBURIDE (DIABETA) 5 MG tablet take 1 tablet by mouth twice a day with meals 60 tablet 2    ibuprofen (ADVIL;MOTRIN) 600 MG tablet Take 1 tablet by mouth every 6 hours as needed for Pain 30 tablet 0    blood glucose test strips (TRUETEST TEST) strip 1 each by In Vitro route daily As needed.  100 each 3    TRUEplus Lancets 30G MISC 1 each by Does not apply route daily 100 each 3    vitamin D (ERGOCALCIFEROL) 1.25 MG (07210 UT) CAPS capsule Take 1 capsule by mouth once a week 4 capsule 2    metFORMIN (GLUCOPHAGE-XR) 500 MG extended release tablet take 2 tablets distress. Head: Normocephalic and atraumatic. Right Ear: External ear normal. TM: no bulging, erythema or fluid seen. Left Ear: External ear normal. TM: no bulging, erythema or fluid seen. Nose: Nose normal.   Mouth/Throat: Mask in place. Eyes: Pupils are equal, round, and reactive to light. Right eye exhibits no discharge. Left eye exhibits no discharge. No scleral icterus. Neck: Normal range of motion. Neck supple. No JVD present. No tracheal deviation present. No thyromegaly present. Cardiovascular: Normal rate, regular rhythm, normal heart sounds. Pulmonary/Chest: Effort normal and breath sounds normal. No respiratory distress. She has no wheezes. She has no rales. Abdominal: Soft. Bowel sounds are normal.  She exhibits no distension and no mass. There is no tenderness. There is no rebound and no guarding. Musculoskeletal: Normal range of motion. She exhibits no edema or tenderness. Lymphadenopathy:    She has no cervical adenopathy. Neurological:  She is alert and oriented to person, place, and time. Cranial nerves grossly intact. No sensation problem noted. Muscle strength 5/5 throughout. Skin: Skin is warm and dry. No rash noted. No erythema. Psychiatric:  She has a normal mood and affect. Behavior is normal.    A1c 7.8. Samson Charles was seen today for annual exam.    Diagnoses and all orders for this visit:    Encounter for well adult exam with abnormal findings    Sciatic leg pain  -     [facility] [place]    Acne rosacea  -     metroNIDAZOLE (METROGEL) 0.75 % gel; Apply topically 2 times daily. Uncontrolled type 2 diabetes mellitus with hyperglycemia (HCC)  -     Semaglutide,0.25 or 0.5MG/DOS, (OZEMPIC, 0.25 OR 0.5 MG/DOSE,) 2 MG/1.5ML SOPN; Inject 0.5 mg into the skin once a week Start with 0.25 mg weekly for 4 weeks. -     POCT glycosylated hemoglobin (Hb A1C)    Patient is doing well overall. I did will send her to physical therapy for her sciatic pain.   She also complains about rosacea which I did give her topicals for. The patient will start a new medication hopefully this will also help with her weight. Discussed again in detail diet and exercise. Call or return to clinic prn if these symptoms worsen or fail to improve as anticipated. I have reviewed the instructions with the patient, answering all questions to her satisfaction. Patient Counseling:  --Nutrition: Stressed importance of moderation in sodium/caffeine intake, saturated fat and cholesterol, caloric balance, sufficient intake of fresh fruits, vegetables, fiber, calcium, iron, and 1 mg of folate supplement per day (for females capable of pregnancy). --Exercise: Stressed the importance of regular exercise. --Substance Abuse: Discussed cessation/primary prevention of tobacco, alcohol, or other drug use; driving or other dangerous activities under the influence; availability of treatment for abuse. --Sexuality: Discussed sexually transmitted diseases, partner selection, use of condoms, avoidance of unintended pregnancy  and contraceptive alternatives. --Injury prevention: Discussed safety belts, safety helmets, smoke detector, smoking near bedding or upholstery. --Dental health: Discussed importance of regular tooth brushing, flossing, and dental visits. --Immunizations reviewed. --After hours service discussed with patient    Aubree Flores received counseling on the following healthy behaviors: nutrition, exercise and medication adherence  Reviewed prior labs and health maintenance. Continue current medications, diet and exercise. Discussed use, benefit, and side effects of prescribed medications. Barriers to medication compliance addressed. Patient given educational materials - see patient instructions. All patient questions answered. Patient voiced understanding.       Follow up in 3 months     (Please note that portions of this note were completed with a voice recognition program. Efforts were made to edit the dictations but occasionally words are mis-transcribed.)

## 2021-03-08 NOTE — PATIENT INSTRUCTIONS
Patient Education        Sciatica: Exercises  Introduction  Here are some examples of typical rehabilitation exercises for your condition. Start each exercise slowly. Ease off the exercise if you start to have pain. Your doctor or physical therapist will tell you when you can start these exercises and which ones will work best for you. When you are not being active, find a comfortable position for rest. Some people are comfortable on the floor or a medium-firm bed with a small pillow under their head and another under their knees. Some people prefer to lie on their side with a pillow between their knees. Don't stay in one position for too long. Take short walks (10 to 20 minutes) every 2 to 3 hours. Avoid slopes, hills, and stairs until you feel better. Walk only distances you can manage without pain, especially leg pain. How to do the exercises  Back stretches   1. Get down on your hands and knees on the floor. 2. Relax your head and allow it to droop. Round your back up toward the ceiling until you feel a nice stretch in your upper, middle, and lower back. Hold this stretch for as long as it feels comfortable, or about 15 to 30 seconds. 3. Return to the starting position with a flat back while you are on your hands and knees. 4. Let your back sway by pressing your stomach toward the floor. Lift your buttocks toward the ceiling. 5. Hold this position for 15 to 30 seconds. 6. Repeat 2 to 4 times. Follow-up care is a key part of your treatment and safety. Be sure to make and go to all appointments, and call your doctor if you are having problems. It's also a good idea to know your test results and keep a list of the medicines you take. Where can you learn more? Go to [email or web address]://Sanojyoti.Han grass [email or web address] org and sign in to your [facility] account. Enter P680 in the Archetypes box to learn more about \"Sciatica: Exercises. \"     If you do not have an account, please click on the \"Sign Up Now\" link.  Current as of: March 2, 2020               Content Version: 12.6  © 1866-3652 [employer]. Care instructions adapted under license by [facility] ([facility]). If you have questions about a medical condition or this instruction, always ask your healthcare professional. Indy Prude any warranty or liability for your use of this information.

## 2021-03-10 ENCOUNTER — TELEPHONE (OUTPATIENT)
Dept: FAMILY MEDICINE CLINIC | Age: 41
End: 2021-03-10

## 2021-03-10 DIAGNOSIS — E66.9 DIABETES MELLITUS TYPE 2 IN OBESE (HCC): ICD-10-CM

## 2021-03-10 DIAGNOSIS — E11.69 DIABETES MELLITUS TYPE 2 IN OBESE (HCC): ICD-10-CM

## 2021-03-10 DIAGNOSIS — E11.65 UNCONTROLLED TYPE 2 DIABETES MELLITUS WITH HYPERGLYCEMIA (HCC): Primary | ICD-10-CM

## 2021-03-10 DIAGNOSIS — Z11.59 NEED FOR HEPATITIS C SCREENING TEST: ICD-10-CM

## 2021-03-10 DIAGNOSIS — E55.9 VITAMIN D DEFICIENCY: ICD-10-CM

## 2021-03-15 ENCOUNTER — HOSPITAL ENCOUNTER (OUTPATIENT)
Dept: PHYSICAL THERAPY | Facility: CLINIC | Age: 41
Setting detail: THERAPIES SERIES
Discharge: HOME | End: 2021-03-15
Payer: COMMERCIAL

## 2021-03-15 NOTE — FLOWSHEET NOTE
[] [facility]) - [facility]  [facility]  [address]    [phone]  F: [phone]   [] [address]   [address]  P: [phone]  F: [phone]  [] [address] [facility]  Therapy  [address]  P: [phone]  F: [phone] [] [address]  P: [phone]  F: [phone]  [x] [address]  [address]   [address]   [place]: [phone]  F: [phone]   [] [address]  [place]: [phone]   F: [phone]     Physical Therapy Cancel/No Show note    Date:   Patient: Janell Rutherford  : 1980  MRN: [id number]    Cancels/No Shows to date: 1    For today's appointment patient:    []  Cancelled    [] Rescheduled appointment    [] No-show     Reason given by patient:    []  Patient ill    []  Conflicting appointment    [] No transportation      [] Conflict with work    [] No reason given    [] Weather related    [] [id number]    [x] Other:      Comments:  No call no show      [] Next appointment was confirmed    Electronically signed by: Varun Mcnamara

## 2021-04-12 ENCOUNTER — HOSPITAL ENCOUNTER (OUTPATIENT)
Age: 41
Setting detail: SPECIMEN
Discharge: HOME | End: 2021-04-12
Payer: COMMERCIAL

## 2021-04-12 DIAGNOSIS — Z11.59 NEED FOR HEPATITIS C SCREENING TEST: ICD-10-CM

## 2021-04-12 DIAGNOSIS — E66.9 DIABETES MELLITUS TYPE 2 IN OBESE (HCC): ICD-10-CM

## 2021-04-12 DIAGNOSIS — E11.69 DIABETES MELLITUS TYPE 2 IN OBESE (HCC): ICD-10-CM

## 2021-04-12 DIAGNOSIS — E55.9 VITAMIN D DEFICIENCY: ICD-10-CM

## 2021-04-12 DIAGNOSIS — E11.65 UNCONTROLLED TYPE 2 DIABETES MELLITUS WITH HYPERGLYCEMIA (HCC): ICD-10-CM

## 2021-04-12 LAB
-: ABNORMAL
ABSOLUTE EOS #: 0.12 K/UL (ref 0–0.44)
ABSOLUTE IMMATURE GRANULOCYTE: <0.03 K/UL (ref 0–0.3)
ABSOLUTE LYMPH #: 2.38 K/UL (ref 1.1–3.7)
ABSOLUTE MONO #: 0.42 K/UL (ref 0.1–1.2)
ALBUMIN SERPL-MCNC: 3.8 G/DL (ref 3.5–5.2)
ALBUMIN/GLOBULIN RATIO: 1 (ref 1–2.5)
ALP BLD-CCNC: 66 U/L (ref 35–104)
ALT SERPL-CCNC: 43 U/L (ref 5–33)
AMORPHOUS: ABNORMAL
ANION GAP SERPL CALCULATED.3IONS-SCNC: 10 MMOL/L (ref 9–17)
AST SERPL-CCNC: 32 U/L
BACTERIA: ABNORMAL
BASOPHILS # BLD: 1 % (ref 0–2)
BASOPHILS ABSOLUTE: 0.04 K/UL (ref 0–0.2)
BILIRUB SERPL-MCNC: 0.29 MG/DL (ref 0.3–1.2)
BILIRUBIN URINE: NEGATIVE
BUN BLDV-MCNC: 12 MG/DL (ref 6–20)
BUN/CREAT BLD: ABNORMAL (ref 9–20)
CALCIUM SERPL-MCNC: 9 MG/DL (ref 8.6–10.4)
CASTS UA: ABNORMAL /LPF (ref 0–8)
CHLORIDE BLD-SCNC: 105 MMOL/L (ref 98–107)
CHOLESTEROL/HDL RATIO: 3.6
CHOLESTEROL: 187 MG/DL
CO2: 20 MMOL/L (ref 20–31)
COLOR: YELLOW
COMMENT UA: ABNORMAL
CREAT SERPL-MCNC: 0.72 MG/DL (ref 0.5–0.9)
CREATININE URINE: 181 MG/DL (ref 28–217)
CRYSTALS, UA: ABNORMAL /HPF
DIFFERENTIAL TYPE: NORMAL
EOSINOPHILS RELATIVE PERCENT: 2 % (ref 1–4)
EPITHELIAL CELLS UA: ABNORMAL /HPF (ref 0–5)
GFR AFRICAN AMERICAN: >60 ML/MIN
GFR NON-AFRICAN AMERICAN: >60 ML/MIN
GFR SERPL CREATININE-BSD FRML MDRD: ABNORMAL ML/MIN/{1.73_M2}
GFR SERPL CREATININE-BSD FRML MDRD: ABNORMAL ML/MIN/{1.73_M2}
GLUCOSE BLD-MCNC: 179 MG/DL (ref 70–99)
GLUCOSE URINE: NEGATIVE
HCT VFR BLD CALC: 38.1 % (ref 36.3–47.1)
HDLC SERPL-MCNC: 52 MG/DL
HEMOGLOBIN: 12.7 G/DL (ref 11.9–15.1)
HEPATITIS C ANTIBODY: NONREACTIVE
IMMATURE GRANULOCYTES: 0 %
KETONES, URINE: ABNORMAL
LDL CHOLESTEROL: 112 MG/DL (ref 0–130)
LEUKOCYTE ESTERASE, URINE: NEGATIVE
LYMPHOCYTES # BLD: 30 % (ref 24–43)
MCH RBC QN AUTO: 32.2 PG (ref 25.2–33.5)
MCHC RBC AUTO-ENTMCNC: 33.3 G/DL (ref 28.4–34.8)
MCV RBC AUTO: 96.5 FL (ref 82.6–102.9)
MICROALBUMIN/CREAT 24H UR: <12 MG/L
MICROALBUMIN/CREAT UR-RTO: NORMAL MCG/MG CREAT
MONOCYTES # BLD: 5 % (ref 3–12)
MUCUS: ABNORMAL
NITRITE, URINE: NEGATIVE
NRBC AUTOMATED: 0 PER 100 WBC
OTHER OBSERVATIONS UA: ABNORMAL
PDW BLD-RTO: 11.9 % (ref 11.8–14.4)
PH UA: 5.5 (ref 5–8)
PLATELET # BLD: 326 K/UL (ref 138–453)
PLATELET ESTIMATE: NORMAL
PMV BLD AUTO: 10.4 FL (ref 8.1–13.5)
POTASSIUM SERPL-SCNC: 4.1 MMOL/L (ref 3.7–5.3)
PROTEIN UA: NEGATIVE
RBC # BLD: 3.95 M/UL (ref 3.95–5.11)
RBC # BLD: NORMAL 10*6/UL
RBC UA: ABNORMAL /HPF (ref 0–4)
RENAL EPITHELIAL, UA: ABNORMAL /HPF
SEG NEUTROPHILS: 62 % (ref 36–65)
SEGMENTED NEUTROPHILS ABSOLUTE COUNT: 5.09 K/UL (ref 1.5–8.1)
SODIUM BLD-SCNC: 135 MMOL/L (ref 135–144)
SPECIFIC GRAVITY UA: 1.02 (ref 1–1.03)
TOTAL PROTEIN: 7.7 G/DL (ref 6.4–8.3)
TRICHOMONAS: ABNORMAL
TRIGL SERPL-MCNC: 113 MG/DL
TSH SERPL DL<=0.05 MIU/L-ACNC: 1.11 MIU/L (ref 0.3–5)
TURBIDITY: ABNORMAL
URINE HGB: NEGATIVE
UROBILINOGEN, URINE: NORMAL
VITAMIN D 25-HYDROXY: 17.6 NG/ML (ref 30–100)
VLDLC SERPL CALC-MCNC: NORMAL MG/DL (ref 1–30)
WBC # BLD: 8.1 K/UL (ref 3.5–11.3)
WBC # BLD: NORMAL 10*3/UL
WBC UA: ABNORMAL /HPF (ref 0–5)
YEAST: ABNORMAL

## 2021-04-14 DIAGNOSIS — E55.9 VITAMIN D DEFICIENCY: ICD-10-CM

## 2021-04-14 RX ORDER — PEN NEEDLE, DIABETIC 31 GX5/16"
NEEDLE, DISPOSABLE MISCELLANEOUS
Qty: 100 EACH | Refills: 3 | Status: SHIPPED | OUTPATIENT
Start: 2021-04-14

## 2021-04-14 RX ORDER — ERGOCALCIFEROL 1.25 MG/1
CAPSULE ORAL
Qty: 12 CAPSULE | Refills: 0 | Status: SHIPPED | OUTPATIENT
Start: 2021-04-14 | End: 2021-07-06

## 2021-04-15 NOTE — RESULT ENCOUNTER NOTE
Vitamin D is very low. Please call in vitamin D supplement with 3 refills. Glucose level elevated as expected. But also her liver function is elevated. I will discussed this with her at her next visit in June. All her other blood work with normal limits. Thank you.

## 2021-04-16 ENCOUNTER — TELEPHONE (OUTPATIENT)
Dept: FAMILY MEDICINE CLINIC | Age: 41
End: 2021-04-16

## 2021-04-16 NOTE — TELEPHONE ENCOUNTER
Regarding her vitamin D level very low taking 50,000 units weekly should she continue on that dose please advise?

## 2021-05-04 RX ORDER — GLUCOSAM/CHON-MSM1/C/MANG/BOSW 500-416.6
1 TABLET ORAL DAILY
Qty: 100 EACH | Refills: 5 | Status: SHIPPED | OUTPATIENT
Start: 2021-05-04 | End: 2022-05-16

## 2021-06-08 ENCOUNTER — OFFICE VISIT (OUTPATIENT)
Dept: FAMILY MEDICINE CLINIC | Age: 41
End: 2021-06-08
Payer: COMMERCIAL

## 2021-06-08 VITALS
OXYGEN SATURATION: 99 % | TEMPERATURE: 97.9 F | HEART RATE: 89 BPM | SYSTOLIC BLOOD PRESSURE: 124 MMHG | DIASTOLIC BLOOD PRESSURE: 84 MMHG

## 2021-06-08 DIAGNOSIS — R74.8 ELEVATED LIVER ENZYMES: ICD-10-CM

## 2021-06-08 DIAGNOSIS — E11.65 UNCONTROLLED TYPE 2 DIABETES MELLITUS WITH HYPERGLYCEMIA (HCC): Primary | ICD-10-CM

## 2021-06-08 LAB — HBA1C MFR BLD: 7.2 %

## 2021-06-08 PROCEDURE — G8417 CALC BMI ABV UP PARAM F/U: HCPCS | Performed by: FAMILY MEDICINE

## 2021-06-08 PROCEDURE — 1036F TOBACCO NON-USER: CPT | Performed by: FAMILY MEDICINE

## 2021-06-08 PROCEDURE — 3051F HG A1C>EQUAL 7.0%<8.0%: CPT | Performed by: FAMILY MEDICINE

## 2021-06-08 PROCEDURE — 83036 HEMOGLOBIN GLYCOSYLATED A1C: CPT | Performed by: FAMILY MEDICINE

## 2021-06-08 PROCEDURE — 2022F DILAT RTA XM EVC RTNOPTHY: CPT | Performed by: FAMILY MEDICINE

## 2021-06-08 PROCEDURE — G8427 DOCREV CUR MEDS BY ELIG CLIN: HCPCS | Performed by: FAMILY MEDICINE

## 2021-06-08 PROCEDURE — 99214 OFFICE O/P EST MOD 30 MIN: CPT | Performed by: FAMILY MEDICINE

## 2021-06-08 ASSESSMENT — PATIENT HEALTH QUESTIONNAIRE - PHQ9
1. LITTLE INTEREST OR PLEASURE IN DOING THINGS: 0
SUM OF ALL RESPONSES TO PHQ QUESTIONS 1-9: 0
2. FEELING DOWN, DEPRESSED OR HOPELESS: 0
SUM OF ALL RESPONSES TO PHQ QUESTIONS 1-9: 0
SUM OF ALL RESPONSES TO PHQ9 QUESTIONS 1 & 2: 0
SUM OF ALL RESPONSES TO PHQ QUESTIONS 1-9: 0

## 2021-06-08 NOTE — PATIENT INSTRUCTIONS
Patient Education        Nonalcoholic Steatohepatitis (PERSAUD): Care Instructions  Your Care Instructions     Nonalcoholic steatohepatitis (PERSAUD) is liver inflammation. It is caused by a buildup of fat in the liver. The fat buildup is not caused by drinking alcohol. Because of the inflammation, the liver does not work as well as it should. PERSAUD is part of a group of liver diseases called nonalcoholic fatty liver disease. In these diseases, fat builds up in the liver and sometimes causes liver damage. This damage can get worse over time. Follow-up care is a key part of your treatment and safety. Be sure to make and go to all appointments, and call your doctor if you are having problems. It's also a good idea to know your test results and keep a list of the medicines you take. How can you care for yourself at home? · Stay at a healthy weight. Or if you need to, slowly get to a healthy weight. · Control your cholesterol. Talk to your doctor about ways to lower your cholesterol, if needed. You might try getting active, taking medicines, and making healthy changes to your diet. · Eat healthy foods. This includes fruits, vegetables, lean meats and dairy, and whole grains. · If you have diabetes, keep your blood sugar at your target level. · Get at least 30 minutes of exercise on most days of the week. Walking is a good choice. You also may want to do other activities, such as running, swimming, cycling, or playing tennis or team sports. · Limit alcohol, or do not drink. Alcohol can damage the liver and cause health problems. When should you call for help? Call 911 anytime you think you may need emergency care. For example, call if:    · You have trouble breathing.     · You vomit blood or what looks like coffee grounds.    Call your doctor now or seek immediate medical care if:    · You feel very sleepy or confused.     · You have new or worse belly pain.     · You have a fever.     · There is a new or increasing yellow tint to your skin or the whites of your eyes.     · You have any abnormal bleeding, such as:  ? Nosebleeds. ? Vaginal bleeding that is different (heavier, more frequent, at a different time of the month) than what you are used to.  ? Bloody or black stools, or rectal bleeding. ? Bloody or pink urine. Watch closely for changes in your health, and be sure to contact your doctor if:    · Your belly is getting bigger.     · You are gaining weight.     · You have any problems. Where can you learn more? Go to https://Casa SystemspePrinted Pieceeb.DocuTAP. org and sign in to your [employer] account. Enter V498 in the Retail Optimization box to learn more about \"Nonalcoholic Steatohepatitis (PERSAUD): Care Instructions. \"     If you do not have an account, please click on the \"Sign Up Now\" link. Current as of: April 15, 2020               Content Version: 12.8  © 2006-2021 [employer]wise, Incorporated. Care instructions adapted under license by [facility] ([facility]). If you have questions about a medical condition or this instruction, always ask your healthcare professional. [place] [address] any warranty or liability for your use of this information.

## 2021-06-08 NOTE — PROGRESS NOTES
General FM note    José Miguel Griffin is a 39 y.o. female who presents today for follow up on her  medical conditions as noted below. José Miguel Griffin is c/o of   Chief Complaint   Patient presents with    3 Month Follow-Up       Patient Active Problem List:     Dysmenorrhea     AUB-N     Adenomyosis     s/p RALH w/BS     Uncontrolled type 2 diabetes mellitus without complication, with long-term current use of insulin     Vaginal yeast infection     Family history of breast cancer     Family history of ovarian cancer     Past Medical History:   Diagnosis Date    DM (diabetes mellitus) ([facility] [address] 2016    DUB (dysfunctional uterine bleeding)     adenomyosis    H/O menorrhagia     History of blood transfusion     no reaction    Hx of blood clots     2001 left leg    Vitamin D deficiency       Past Surgical History:   Procedure Laterality Date    CERVIX LESION DESTRUCTION  1998    Cryo    HYSTERECTOMY  09/15/2016    robotic total hyst with bilateral salpingo    TONSILLECTOMY      TUBAL LIGATION  03/2009     Family History   Problem Relation Age of Onset    Diabetes Mother     Stroke Mother     Heart Disease Mother     High Blood Pressure Mother     Diabetes Father     Stroke Father     Heart Disease Father      Current Outpatient Medications   Medication Sig Dispense Refill    Dulaglutide 0.75 MG/0.5ML SOPN Inject 0.75 mg into the skin every 7 days 4 pen 1    SITagliptin (JANUVIA) 50 MG tablet Take 1 tablet by mouth daily 30 tablet 5    TRUEplus Lancets 30G MISC 1 each by Does not apply route daily 100 each 5    vitamin D (ERGOCALCIFEROL) 1.25 MG (51290 UT) CAPS capsule take 1 capsule by mouth every week 12 capsule 0    B-D UF III MINI PEN NEEDLES 31G X 5 MM MISC use twice a day 100 each 3    metroNIDAZOLE (METROGEL) 0.75 % gel Apply topically 2 times daily.  45 g 1    Semaglutide,0.25 or 0.5MG/DOS, (OZEMPIC, 0.25 OR 0.5 MG/DOSE,) 2 MG/1.5ML SOPN Inject 0.5 mg into the skin once a week Start with 0.25 mg 08/04/2016 (Exact Date)   SpO2 99%     Subjective:      HPI    38 yo female coming in today for follow-up of her type 2 diabetes mellitus. Patient has been doing quite well. She tells me that she did start the Ozempic because she experienced quite a lot of this medication. She has been out for couple weeks now she also states that the glimepiride 2.5 g a day did not agree with her digestive tract. She had just some discomfort in her belly. She also tells me that she did feel much better on the insulin daily. 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 She feels this would help her rate    She does not have any other symptoms. Review of Systems   Constitutional: Negative for fever and unexpected weight change. Pertinent items are noted in HPI. Objective:   Physical Exam  Constitutional: VS (see above). General appearance: normal development, habitus and attention, no deformities. No distress. Eyes: normal conjunctiva and lids. CAV: RRR, no RMG. No edema lower extremities. Pulmo: CTA bilateral, no CWR. Skin: no rashes, lesions or ulcers. Musculoskeletal: normal gait. Nails: no clubbing or cyanosis. Psychiatric: alert and oriented to place, time and person. Normal mood and affect. A1c today 7.2. Assessment:       Diagnosis Orders   1. Uncontrolled type 2 diabetes mellitus with hyperglycemia (HCC)  POCT glycosylated hemoglobin (Hb A1C)   2. Elevated liver enzymes  US LIVER       Plan:   The patient is A1c came down quite nicely. I do not believe that the patient needs to be on insulin. I will start her on multiple p.o. medication. Hopefully they will be paid for. The patient will let me know. I also discussed with her her recent blood work.   She was found to have elevated

## 2021-06-12 ENCOUNTER — HOSPITAL ENCOUNTER (OUTPATIENT)
Dept: ULTRASOUND IMAGING | Age: 41
Discharge: HOME | End: 2021-06-14
Payer: COMMERCIAL

## 2021-06-12 DIAGNOSIS — R74.8 ELEVATED LIVER ENZYMES: ICD-10-CM

## 2021-06-12 PROCEDURE — 76705 ECHO EXAM OF ABDOMEN: CPT

## 2021-07-06 DIAGNOSIS — E55.9 VITAMIN D DEFICIENCY: ICD-10-CM

## 2021-07-06 DIAGNOSIS — E11.65 UNCONTROLLED TYPE 2 DIABETES MELLITUS WITH HYPERGLYCEMIA (HCC): ICD-10-CM

## 2021-07-06 RX ORDER — METFORMIN HYDROCHLORIDE 500 MG/1
1000 TABLET, EXTENDED RELEASE ORAL 2 TIMES DAILY WITH MEALS
Qty: 60 TABLET | Refills: 2 | Status: SHIPPED | OUTPATIENT
Start: 2021-07-06 | End: 2021-09-24

## 2021-07-06 RX ORDER — ERGOCALCIFEROL 1.25 MG/1
CAPSULE ORAL
Qty: 12 CAPSULE | Refills: 0 | Status: SHIPPED | OUTPATIENT
Start: 2021-07-06 | End: 2021-11-29

## 2021-07-06 NOTE — TELEPHONE ENCOUNTER
Sarah Woodard is calling to request a refill on the following medication(s):    Last Visit Date (If Applicable):  3/5/1795    Next Visit Date:    9/8/2021    Medication Request:  Requested Prescriptions     Pending Prescriptions Disp Refills    metFORMIN (GLUCOPHAGE-XR) 500 MG extended release tablet [Pharmacy Med Name: METFORMIN HCL  MG TABLET] 120 tablet 3     Sig: take 2 tablets by mouth twice a day    vitamin D (ERGOCALCIFEROL) 1.25 MG (47267 UT) CAPS capsule [Pharmacy Med Name: VITAMIN D2 1.25MG(50,000 UNIT)] 12 capsule 0     Sig: take 1 capsule by mouth every week

## 2021-08-06 DIAGNOSIS — E66.9 DIABETES MELLITUS TYPE 2 IN OBESE (HCC): ICD-10-CM

## 2021-08-06 DIAGNOSIS — E11.69 DIABETES MELLITUS TYPE 2 IN OBESE (HCC): ICD-10-CM

## 2021-08-09 DIAGNOSIS — L71.9 ACNE ROSACEA: ICD-10-CM

## 2021-08-09 RX ORDER — LISINOPRIL 5 MG/1
TABLET ORAL
Qty: 15 TABLET | Refills: 3 | Status: SHIPPED | OUTPATIENT
Start: 2021-08-09 | End: 2021-12-09

## 2021-08-09 NOTE — TELEPHONE ENCOUNTER
Chicho Del Toro is calling to request a refill on the following medication(s):    Last Visit Date (If Applicable):  8/5/6117    Next Visit Date:    9/8/2021    Medication Request:  Requested Prescriptions     Pending Prescriptions Disp Refills    lisinopril (PRINIVIL;ZESTRIL) 5 MG tablet [Pharmacy Med Name: LISINOPRIL 5 MG TABLET] 15 tablet 3     Sig: take 1/2 tablet by mouth once daily

## 2021-08-10 RX ORDER — METRONIDAZOLE 7.5 MG/G
GEL TOPICAL
Qty: 45 G | Refills: 1 | Status: SHIPPED | OUTPATIENT
Start: 2021-08-10 | End: 2022-10-20

## 2021-08-10 NOTE — TELEPHONE ENCOUNTER
Timi Remy is calling to request a refill on the following medication(s):    Last Visit Date (If Applicable):  8/7/7111    Next Visit Date:    9/8/2021    Medication Request:  Requested Prescriptions     Pending Prescriptions Disp Refills    metroNIDAZOLE (METROGEL) 0.75 % gel [Pharmacy Med Name: METRONIDAZOLE TOPICAL 0.75% GL] 45 g 1     Sig: apply topically to affected area twice a day

## 2021-09-02 ENCOUNTER — PATIENT MESSAGE (OUTPATIENT)
Dept: FAMILY MEDICINE CLINIC | Age: 41
End: 2021-09-02

## 2021-09-02 RX ORDER — CLINDAMYCIN HYDROCHLORIDE 300 MG/1
300 CAPSULE ORAL 2 TIMES DAILY
Qty: 14 CAPSULE | Refills: 0 | Status: SHIPPED | OUTPATIENT
Start: 2021-09-02 | End: 2021-09-09

## 2021-09-02 NOTE — TELEPHONE ENCOUNTER
From: Lyle Leach  To: Roro Juarez MD  Sent: 9/2/2021 1:35 PM EDT  Subject: Non-Urgent Medical Question    I had a tooth that broke off and is causing me quite a bit of pain, I have a dentist appointment scheduled, but since I'm a new patient there, they said to contact you to see about possibly starting antibiotics, my appointment isn't until September 10 for the dentist and it's starting to swell up my face a little bit and it definitely hurts.

## 2021-09-08 ENCOUNTER — OFFICE VISIT (OUTPATIENT)
Dept: FAMILY MEDICINE CLINIC | Age: 41
End: 2021-09-08
Payer: COMMERCIAL

## 2021-09-08 ENCOUNTER — HOSPITAL ENCOUNTER (OUTPATIENT)
Age: 41
Setting detail: SPECIMEN
Discharge: HOME | End: 2021-09-08
Payer: COMMERCIAL

## 2021-09-08 VITALS
OXYGEN SATURATION: 98 % | BODY MASS INDEX: 34.51 KG/M2 | TEMPERATURE: 97.2 F | DIASTOLIC BLOOD PRESSURE: 82 MMHG | HEART RATE: 68 BPM | WEIGHT: 213.8 LBS | SYSTOLIC BLOOD PRESSURE: 115 MMHG

## 2021-09-08 DIAGNOSIS — E11.69 DIABETES MELLITUS TYPE 2 IN OBESE (HCC): Primary | ICD-10-CM

## 2021-09-08 DIAGNOSIS — E11.69 DIABETES MELLITUS TYPE 2 IN OBESE (HCC): ICD-10-CM

## 2021-09-08 DIAGNOSIS — E66.9 DIABETES MELLITUS TYPE 2 IN OBESE (HCC): ICD-10-CM

## 2021-09-08 DIAGNOSIS — E66.9 DIABETES MELLITUS TYPE 2 IN OBESE (HCC): Primary | ICD-10-CM

## 2021-09-08 LAB
ESTIMATED AVERAGE GLUCOSE: 160 MG/DL
HBA1C MFR BLD: 7.2 % (ref 4–6)

## 2021-09-08 PROCEDURE — 99213 OFFICE O/P EST LOW 20 MIN: CPT | Performed by: FAMILY MEDICINE

## 2021-09-08 PROCEDURE — 1036F TOBACCO NON-USER: CPT | Performed by: FAMILY MEDICINE

## 2021-09-08 PROCEDURE — 3051F HG A1C>EQUAL 7.0%<8.0%: CPT | Performed by: FAMILY MEDICINE

## 2021-09-08 PROCEDURE — 2022F DILAT RTA XM EVC RTNOPTHY: CPT | Performed by: FAMILY MEDICINE

## 2021-09-08 PROCEDURE — G8417 CALC BMI ABV UP PARAM F/U: HCPCS | Performed by: FAMILY MEDICINE

## 2021-09-08 PROCEDURE — G8427 DOCREV CUR MEDS BY ELIG CLIN: HCPCS | Performed by: FAMILY MEDICINE

## 2021-09-08 ASSESSMENT — PATIENT HEALTH QUESTIONNAIRE - PHQ9
1. LITTLE INTEREST OR PLEASURE IN DOING THINGS: 0
SUM OF ALL RESPONSES TO PHQ QUESTIONS 1-9: 0
2. FEELING DOWN, DEPRESSED OR HOPELESS: 0
SUM OF ALL RESPONSES TO PHQ9 QUESTIONS 1 & 2: 0
SUM OF ALL RESPONSES TO PHQ QUESTIONS 1-9: 0
SUM OF ALL RESPONSES TO PHQ QUESTIONS 1-9: 0

## 2021-09-08 NOTE — PROGRESS NOTES
General FM note    Tia Dumont is a 39 y.o. female who presents today for follow up on her  medical conditions as noted below.   Tia Dumont is c/o of   Chief Complaint   Patient presents with    3 Month Follow-Up       Patient Active Problem List:     Dysmenorrhea     AUB-N     Adenomyosis     s/p RALH w/BS     Uncontrolled type 2 diabetes mellitus without complication, with long-term current use of insulin     Vaginal yeast infection     Family history of breast cancer     Family history of ovarian cancer     Past Medical History:   Diagnosis Date    DM (diabetes mellitus) ([facility] [address] 2016    DUB (dysfunctional uterine bleeding)     adenomyosis    H/O menorrhagia     History of blood transfusion     no reaction    Hx of blood clots     2001 left leg    Vitamin D deficiency       Past Surgical History:   Procedure Laterality Date    CERVIX LESION DESTRUCTION  1998    Cryo    HYSTERECTOMY  09/15/2016    robotic total hyst with bilateral salpingo    TONSILLECTOMY      TUBAL LIGATION  03/2009     Family History   Problem Relation Age of Onset    Diabetes Mother     Stroke Mother     Heart Disease Mother     High Blood Pressure Mother     Diabetes Father     Stroke Father     Heart Disease Father      Current Outpatient Medications   Medication Sig Dispense Refill    clindamycin (CLEOCIN) 300 MG capsule Take 1 capsule by mouth 2 times daily for 7 days 14 capsule 0    metroNIDAZOLE (METROGEL) 0.75 % gel apply topically to affected area twice a day 45 g 1    lisinopril (PRINIVIL;ZESTRIL) 5 MG tablet take 1/2 tablet by mouth once daily 15 tablet 3    metFORMIN (GLUCOPHAGE-XR) 500 MG extended release tablet Take 2 tablets by mouth 2 times daily (with meals) take 2 tablets by mouth twice a day 60 tablet 2    vitamin D (ERGOCALCIFEROL) 1.25 MG (78865 UT) CAPS capsule take 1 capsule by mouth every week 12 capsule 0    Dulaglutide 0.75 MG/0.5ML SOPN Inject 0.75 mg into the skin every 7 days 4 pen 1    TRUEplus Lancets 30G MISC 1 each by Does not apply route daily 100 each 5    B-D UF III MINI PEN NEEDLES 31G X 5 MM MISC use twice a day 100 each 3    Blood Glucose Monitoring Suppl (TRUE METRIX METER) w/Device KIT 1 each by Does not apply route 3 times daily 1 kit 1    ibuprofen (ADVIL;MOTRIN) 600 MG tablet Take 1 tablet by mouth every 6 hours as needed for Pain 30 tablet 0    blood glucose test strips (TRUETEST TEST) strip 1 each by In Vitro route daily As needed. 100 each 3    simvastatin (ZOCOR) 40 MG tablet Take 1 tablet by mouth nightly 30 tablet 3    cetirizine (ZYRTEC) 10 MG tablet take 1 tablet by mouth once daily 30 tablet 3    insulin glargine (LANTUS) 100 UNIT/ML injection pen Inject 50 Units into the skin 2 times daily 5 pen 3    albuterol sulfate (PROAIR RESPICLICK) 448 (90 Base) MCG/ACT aerosol powder inhalation Inhale 1 puff into the lungs every 6 hours as needed for Wheezing or Shortness of Breath 1 Inhaler 0    fluticasone (FLONASE) 50 MCG/ACT nasal spray 1 spray by Nasal route daily 1 Bottle 3    Blood Glucose Monitoring Suppl (FREESTYLE LITE) EDUARDO   0    Blood Glucose Monitoring Suppl (TRUERESULT BLOOD GLUCOSE) W/DEVICE KIT 1 kit by Does not apply route daily 1 kit 0     No current facility-administered medications for this visit. ALLERGIES:    Allergies   Allergen Reactions    Codeine Hives and Itching    Keflex [Cephalexin] Swelling    Penicillins Hives    Vicodin [Hydrocodone-Acetaminophen] Itching       Social History     Tobacco Use    Smoking status: Never Smoker    Smokeless tobacco: Never Used   Substance Use Topics    Alcohol use: Yes     Comment: rarely       Body mass index is 34.51 kg/m². /82   Pulse 68   Temp 97.2 °F (36.2 °C)   Wt 213 lb 12.8 oz (97 kg)   LMP 08/04/2016 (Exact Date)   SpO2 98%   BMI 34.51 kg/m²     Subjective:      HPI    39 y.o. female here for DMT2 FU. Fasting glucose level - around 120 th. Lost weight.  Eating diabetic diet. Started back to the Gym. Review of Systems   Constitutional: Negative for fever and unexpected weight change. Pertinent items are noted in HPI. Objective:   Physical Exam  Constitutional: VS (see above). General appearance: normal development, habitus and attention, no deformities. No distress. Eyes: normal conjunctiva and lids. CAV: RRR, no RMG. No edema lower extremities. Pulmo: CTA bilateral, no CWR. Skin: no rashes, lesions or ulcers. Musculoskeletal: normal gait. Nails: no clubbing or cyanosis. Psychiatric: alert and oriented to place, time and person. Normal mood and affect. Assessment:       Diagnosis Orders   1. Diabetes mellitus type 2 in obese (HCC)  Hemoglobin A1C       Plan:   Continue healhty life style. Return in about 3 months (around 12/8/2021), or if symptoms worsen or fail to improve, for DM II. Orders Placed This Encounter   Procedures    Hemoglobin A1C     Standing Status:   Future     Standing Expiration Date:   9/8/2022     No orders of the defined types were placed in this encounter. Call or return to clinic prn if these symptoms worsen or fail to improve as anticipated. I have reviewed the instructions with the patient, answering all questions to patient's satisfaction. Esperanza received counseling on the following healthy behaviors: nutrition, exercise, and medication adherence  Reviewed prior labs and health maintenance. Continue current medications, diet and exercise. Discussed use, benefit, and side effects of prescribed medications. Barriers to medication compliance addressed. Patient given educational materials - see patient instructions. All patient questions answered. Patient voiced understanding.       Electronically signed by Shruthi French MD on 9/8/2021 at 8:30 AM       (Please note that portions of this note were completed with a voice recognition program. Efforts were made to edit the dictations but occasionally words are mis-transcribed.)

## 2021-09-21 ENCOUNTER — TELEPHONE (OUTPATIENT)
Dept: FAMILY MEDICINE CLINIC | Age: 41
End: 2021-09-21

## 2021-09-21 DIAGNOSIS — E66.9 DIABETES MELLITUS TYPE 2 IN OBESE (HCC): Primary | ICD-10-CM

## 2021-09-21 DIAGNOSIS — E11.69 DIABETES MELLITUS TYPE 2 IN OBESE (HCC): Primary | ICD-10-CM

## 2021-09-24 DIAGNOSIS — E11.65 UNCONTROLLED TYPE 2 DIABETES MELLITUS WITH HYPERGLYCEMIA (HCC): ICD-10-CM

## 2021-09-24 RX ORDER — METFORMIN HYDROCHLORIDE 500 MG/1
TABLET, EXTENDED RELEASE ORAL
Qty: 60 TABLET | Refills: 2 | Status: SHIPPED | OUTPATIENT
Start: 2021-09-24 | End: 2022-01-11

## 2021-09-24 NOTE — TELEPHONE ENCOUNTER
[facility] is calling to request a refill on the following medication(s):    Last Visit Date (If Applicable):  7/6/9510    Next Visit Date:    12/20/2021    Medication Request:  Requested Prescriptions     Pending Prescriptions Disp Refills    metFORMIN (GLUCOPHAGE-XR) 500 MG extended release tablet [Pharmacy Med Name: METFORMIN HCL  MG TABLET] 60 tablet 2     Sig: Take 2 tablets by mouth 2 times daily (with meals)

## 2021-10-15 ENCOUNTER — TELEPHONE (OUTPATIENT)
Dept: FAMILY MEDICINE CLINIC | Age: 41
End: 2021-10-15

## 2021-11-01 RX ORDER — CALCIUM CITRATE/VITAMIN D3 200MG-6.25
TABLET ORAL
Qty: 100 STRIP | Refills: 1 | Status: SHIPPED | OUTPATIENT
Start: 2021-11-01 | End: 2022-03-23

## 2021-11-01 NOTE — TELEPHONE ENCOUNTER
Pancho Ordonez is calling to request a refill on the following medication(s):    Last Visit Date (If Applicable):  3/4/3141    Next Visit Date:    12/20/2021    Medication Request:  Requested Prescriptions     Pending Prescriptions Disp Refills    TRUE METRIX BLOOD GLUCOSE TEST strip [Pharmacy Med Name: TRUE METRIX GLUCOSE TEST STRIP] 100 strip      Sig: TEST ONCE A DAY

## 2021-11-27 DIAGNOSIS — E55.9 VITAMIN D DEFICIENCY: ICD-10-CM

## 2021-11-29 RX ORDER — ERGOCALCIFEROL 1.25 MG/1
CAPSULE ORAL
Qty: 12 CAPSULE | Refills: 0 | Status: SHIPPED | OUTPATIENT
Start: 2021-11-29 | End: 2022-02-14

## 2021-12-09 DIAGNOSIS — E11.69 DIABETES MELLITUS TYPE 2 IN OBESE (HCC): ICD-10-CM

## 2021-12-09 DIAGNOSIS — E66.9 DIABETES MELLITUS TYPE 2 IN OBESE (HCC): ICD-10-CM

## 2021-12-09 RX ORDER — SIMVASTATIN 40 MG
TABLET ORAL
Qty: 30 TABLET | Refills: 1 | Status: SHIPPED | OUTPATIENT
Start: 2021-12-09

## 2021-12-09 RX ORDER — LISINOPRIL 5 MG/1
TABLET ORAL
Qty: 15 TABLET | Refills: 3 | Status: SHIPPED | OUTPATIENT
Start: 2021-12-09

## 2021-12-09 NOTE — TELEPHONE ENCOUNTER
Mendel Stinson is calling to request a refill on the following medication(s):    Last Visit Date (If Applicable):  8/8/6163    Next Visit Date:    12/20/2021    Medication Request:  Requested Prescriptions     Pending Prescriptions Disp Refills    lisinopril (PRINIVIL;ZESTRIL) 5 MG tablet [Pharmacy Med Name: LISINOPRIL 5 MG TABLET] 15 tablet 3     Sig: take 1/2 tablet by mouth once daily

## 2021-12-09 NOTE — TELEPHONE ENCOUNTER
[facility] is calling to request a refill on the following medication(s):    Last Visit Date (If Applicable):  Visit date not found    Next Visit Date:    Visit date not found    Medication Request:  Requested Prescriptions     Pending Prescriptions Disp Refills    simvastatin (ZOCOR) 40 MG tablet [Pharmacy Med Name: SIMVASTATIN 40 MG TABLET] 30 tablet 1     Sig: take 1 tablet by mouth every evening

## 2021-12-20 ENCOUNTER — OFFICE VISIT (OUTPATIENT)
Dept: FAMILY MEDICINE CLINIC | Age: 41
End: 2021-12-20
Payer: COMMERCIAL

## 2021-12-20 VITALS
OXYGEN SATURATION: 98 % | DIASTOLIC BLOOD PRESSURE: 88 MMHG | WEIGHT: 214.4 LBS | BODY MASS INDEX: 34.61 KG/M2 | SYSTOLIC BLOOD PRESSURE: 123 MMHG | HEART RATE: 66 BPM | TEMPERATURE: 98.1 F

## 2021-12-20 DIAGNOSIS — M54.32 CHRONIC SCIATICA, LEFT: ICD-10-CM

## 2021-12-20 DIAGNOSIS — E11.69 DIABETES MELLITUS TYPE 2 IN OBESE (HCC): Primary | ICD-10-CM

## 2021-12-20 DIAGNOSIS — E66.9 DIABETES MELLITUS TYPE 2 IN OBESE (HCC): Primary | ICD-10-CM

## 2021-12-20 LAB — HBA1C MFR BLD: 7.3 %

## 2021-12-20 PROCEDURE — G8417 CALC BMI ABV UP PARAM F/U: HCPCS | Performed by: FAMILY MEDICINE

## 2021-12-20 PROCEDURE — G8427 DOCREV CUR MEDS BY ELIG CLIN: HCPCS | Performed by: FAMILY MEDICINE

## 2021-12-20 PROCEDURE — 99213 OFFICE O/P EST LOW 20 MIN: CPT | Performed by: FAMILY MEDICINE

## 2021-12-20 PROCEDURE — 83036 HEMOGLOBIN GLYCOSYLATED A1C: CPT | Performed by: FAMILY MEDICINE

## 2021-12-20 PROCEDURE — 1036F TOBACCO NON-USER: CPT | Performed by: FAMILY MEDICINE

## 2021-12-20 PROCEDURE — 2022F DILAT RTA XM EVC RTNOPTHY: CPT | Performed by: FAMILY MEDICINE

## 2021-12-20 PROCEDURE — G8484 FLU IMMUNIZE NO ADMIN: HCPCS | Performed by: FAMILY MEDICINE

## 2021-12-20 PROCEDURE — 3051F HG A1C>EQUAL 7.0%<8.0%: CPT | Performed by: FAMILY MEDICINE

## 2021-12-20 RX ORDER — GABAPENTIN 100 MG/1
100 CAPSULE ORAL 2 TIMES DAILY
Qty: 60 CAPSULE | Refills: 5 | Status: SHIPPED | OUTPATIENT
Start: 2021-12-20 | End: 2022-10-03

## 2021-12-20 NOTE — PROGRESS NOTES
General FM note    Jennifer Best is a 39 y.o. female who presents today for follow up on her  medical conditions as noted below. Jennifer Best is c/o of   Chief Complaint   Patient presents with    3 Month Follow-Up       Patient Active Problem List:     Dysmenorrhea     AUB-N     Adenomyosis     s/p RALH w/BS     Uncontrolled type 2 diabetes mellitus without complication, with long-term current use of insulin     Vaginal yeast infection     Family history of breast cancer     Family history of ovarian cancer     Past Medical History:   Diagnosis Date    DM (diabetes mellitus) ([facility] [address] 2016    DUB (dysfunctional uterine bleeding)     adenomyosis    H/O menorrhagia     History of blood transfusion     no reaction    Hx of blood clots     2001 left leg    Vitamin D deficiency       Past Surgical History:   Procedure Laterality Date    CERVIX LESION DESTRUCTION  1998    [place]    HYSTERECTOMY  09/15/2016    robotic total hyst with bilateral salpingo    TONSILLECTOMY      TUBAL LIGATION  03/2009     Family History   Problem Relation Age of Onset    Diabetes Mother     Stroke Mother     Heart Disease Mother     High Blood Pressure Mother     Diabetes Father     Stroke Father     Heart Disease Father      Current Outpatient Medications   Medication Sig Dispense Refill    gabapentin (NEURONTIN) 100 MG capsule Take 1 capsule by mouth 2 times daily for 180 days.  Intended supply: 30 days 60 capsule 5    Dulaglutide 0.75 MG/0.5ML SOPN Inject 1.5 mg into the skin every 7 days 4 pen 3    simvastatin (ZOCOR) 40 MG tablet take 1 tablet by mouth every evening 30 tablet 1    lisinopril (PRINIVIL;ZESTRIL) 5 MG tablet take 1/2 tablet by mouth once daily 15 tablet 3    vitamin D (ERGOCALCIFEROL) 1.25 MG (87496 UT) CAPS capsule take 1 capsule by mouth every week 12 capsule 0    TRUE METRIX BLOOD GLUCOSE TEST strip TEST ONCE A  strip 1    metFORMIN (GLUCOPHAGE-XR) 500 MG extended release tablet Take 2 tablets by mouth 2 times daily (with meals) 60 tablet 2    metroNIDAZOLE (METROGEL) 0.75 % gel apply topically to affected area twice a day 45 g 1    TRUEplus Lancets 30G MISC 1 each by Does not apply route daily 100 each 5    B-D UF III MINI PEN NEEDLES 31G X 5 MM MISC use twice a day 100 each 3    Blood Glucose Monitoring Suppl (TRUE METRIX METER) w/Device KIT 1 each by Does not apply route 3 times daily 1 kit 1    ibuprofen (ADVIL;MOTRIN) 600 MG tablet Take 1 tablet by mouth every 6 hours as needed for Pain 30 tablet 0    cetirizine (ZYRTEC) 10 MG tablet take 1 tablet by mouth once daily 30 tablet 3    albuterol sulfate (PROAIR RESPICLICK) 844 (90 Base) MCG/ACT aerosol powder inhalation Inhale 1 puff into the lungs every 6 hours as needed for Wheezing or Shortness of Breath 1 Inhaler 0    fluticasone (FLONASE) 50 MCG/ACT nasal spray 1 spray by Nasal route daily 1 Bottle 3    Blood Glucose Monitoring Suppl (FREESTYLE LITE) EDUARDO   0    Blood Glucose Monitoring Suppl (TRUERESULT BLOOD GLUCOSE) W/DEVICE KIT 1 kit by Does not apply route daily 1 kit 0     No current facility-administered medications for this visit. ALLERGIES:    Allergies   Allergen Reactions    Codeine Hives and Itching    Keflex [Cephalexin] Swelling    Penicillins Hives    Vicodin [Hydrocodone-Acetaminophen] Itching       Social History     Tobacco Use    Smoking status: Never Smoker    Smokeless tobacco: Never Used   Substance Use Topics    Alcohol use: Yes     Comment: rarely       Body mass index is 34.61 kg/m². /88   Pulse 66   Temp 98.1 °F (36.7 °C)   Wt 214 lb 6.4 oz (97.3 kg)   LMP 08/04/2016 (Exact Date)   SpO2 98%   BMI 34.61 kg/m²     Subjective:      HPI    39 y.o. female here for DMT2 FU. Her diet is \"horrable\". Grab and go type of stuff. She did get an air Daphne Fleeting and she states that she hopefully can change some of her diet.   She tells me her mother was 64years old also has a history of liver disease and she did have surgery done and she still in the hospital because of problems after the surgery per patient due to liver disease. Patient also states that she is not able to exercise as she wants to. She states that she has still this left lower back pain radiating to her left leg. She cannot sit for long time she cannot stand for long time. She has been using ibuprofen over-the-counter. She has been using the stretches but she states the pain is still there. Review of Systems   Constitutional: Negative for fever and unexpected weight change. Pertinent items are noted in HPI. Objective:   Physical Exam  Constitutional: VS (see above). General appearance: normal development, habitus and attention, no deformities. No distress. Eyes: normal conjunctiva and lids. CAV: RRR, no RMG. No edema lower extremities. Pulmo: CTA bilateral, no CWR. Skin: no rashes, lesions or ulcers. Musculoskeletal: normal gait. Nails: no clubbing or cyanosis. Physical Exam  Musculoskeletal:        Legs:       Comments: Discomfort pain. Psychiatric: alert and oriented to place, time and person. Normal mood and affect. A1C: 7.3. Assessment:       Diagnosis Orders   1. Diabetes mellitus type 2 in obese (HCC)  POCT glycosylated hemoglobin (Hb A1C)    Dulaglutide 0.75 MG/0.5ML SOPN   2. Chronic sciatica, left  XR LUMBAR SPINE (2-3 VIEWS)    [facility]    gabapentin (NEURONTIN) 100 MG capsule       Plan:   Patient is doing quite well. I will bump up her Trulicity to 1.5 mg daily. She will try to change her diet. See the pain specialist for further recommendation. Start gabapentin. Return in about 3 months (around 3/20/2022), or if symptoms worsen or fail to improve, for DM II.   Orders Placed This Encounter   Procedures    XR LUMBAR SPINE (2-3 VIEWS)     Standing Status:   Future     Standing Expiration Date:   12/20/2022   [facility]     Referral Priority: Routine     Referral Type:   Eval and Treat     Referral Reason:   Specialty Services Required     Requested Specialty:   Pain Management     Number of Visits Requested:   1    POCT glycosylated hemoglobin (Hb A1C)     Orders Placed This Encounter   Medications    gabapentin (NEURONTIN) 100 MG capsule     Sig: Take 1 capsule by mouth 2 times daily for 180 days. Intended supply: 30 days     Dispense:  60 capsule     Refill:  5    Dulaglutide 0.75 MG/0.5ML SOPN     Sig: Inject 1.5 mg into the skin every 7 days     Dispense:  4 pen     Refill:  3       Call or return to clinic prn if these symptoms worsen or fail to improve as anticipated. I have reviewed the instructions with the patient, answering all questions to patient's satisfaction. Lou Luna received counseling on the following healthy behaviors: nutrition, exercise, and medication adherence  Reviewed prior labs and health maintenance. Continue current medications, diet and exercise. Discussed use, benefit, and side effects of prescribed medications. Barriers to medication compliance addressed. Patient given educational materials - see patient instructions. All patient questions answered. Patient voiced understanding.       Electronically signed by Kilo Araiza MD on 12/20/2021 at 12:41 PM       (Please note that portions of this note were completed with a voice recognition program. Efforts were made to edit the dictations but occasionally words are mis-transcribed.)

## 2021-12-21 DIAGNOSIS — M54.32 CHRONIC SCIATICA, LEFT: ICD-10-CM

## 2022-01-08 DIAGNOSIS — E11.65 UNCONTROLLED TYPE 2 DIABETES MELLITUS WITH HYPERGLYCEMIA (HCC): ICD-10-CM

## 2022-01-11 RX ORDER — METFORMIN HYDROCHLORIDE 500 MG/1
TABLET, EXTENDED RELEASE ORAL
Qty: 60 TABLET | Refills: 2 | Status: SHIPPED | OUTPATIENT
Start: 2022-01-11 | End: 2022-06-21 | Stop reason: SDUPTHER

## 2022-01-11 NOTE — TELEPHONE ENCOUNTER
Ev Mcknight is calling to request a refill on the following medication(s):    Last Visit Date (If Applicable):  82/42/1446    Next Visit Date:    3/21/2022    Medication Request:  Requested Prescriptions     Pending Prescriptions Disp Refills    metFORMIN (GLUCOPHAGE-XR) 500 MG extended release tablet [Pharmacy Med Name: METFORMIN HCL  MG TABLET] 60 tablet 2     Sig: take 2 tablets by mouth twice a day with meals

## 2022-01-25 ENCOUNTER — TELEPHONE (OUTPATIENT)
Dept: PAIN MANAGEMENT | Age: 42
End: 2022-01-25

## 2022-02-13 DIAGNOSIS — E55.9 VITAMIN D DEFICIENCY: ICD-10-CM

## 2022-02-14 RX ORDER — ERGOCALCIFEROL 1.25 MG/1
CAPSULE ORAL
Qty: 12 CAPSULE | Refills: 0 | Status: SHIPPED | OUTPATIENT
Start: 2022-02-14 | End: 2022-05-11

## 2022-03-21 ENCOUNTER — OFFICE VISIT (OUTPATIENT)
Dept: FAMILY MEDICINE CLINIC | Age: 42
End: 2022-03-21
Payer: COMMERCIAL

## 2022-03-21 VITALS
WEIGHT: 217.8 LBS | TEMPERATURE: 97.5 F | HEART RATE: 73 BPM | HEIGHT: 65 IN | OXYGEN SATURATION: 99 % | BODY MASS INDEX: 36.29 KG/M2 | SYSTOLIC BLOOD PRESSURE: 115 MMHG | DIASTOLIC BLOOD PRESSURE: 80 MMHG

## 2022-03-21 DIAGNOSIS — E11.65 UNCONTROLLED TYPE 2 DIABETES MELLITUS WITH HYPERGLYCEMIA (HCC): Primary | ICD-10-CM

## 2022-03-21 DIAGNOSIS — M54.32 CHRONIC SCIATICA, LEFT: ICD-10-CM

## 2022-03-21 LAB — HBA1C MFR BLD: 6.5 %

## 2022-03-21 PROCEDURE — 83036 HEMOGLOBIN GLYCOSYLATED A1C: CPT | Performed by: FAMILY MEDICINE

## 2022-03-21 PROCEDURE — 3044F HG A1C LEVEL LT 7.0%: CPT | Performed by: FAMILY MEDICINE

## 2022-03-21 PROCEDURE — 99214 OFFICE O/P EST MOD 30 MIN: CPT | Performed by: FAMILY MEDICINE

## 2022-03-21 PROCEDURE — G8484 FLU IMMUNIZE NO ADMIN: HCPCS | Performed by: FAMILY MEDICINE

## 2022-03-21 PROCEDURE — 1036F TOBACCO NON-USER: CPT | Performed by: FAMILY MEDICINE

## 2022-03-21 PROCEDURE — G8417 CALC BMI ABV UP PARAM F/U: HCPCS | Performed by: FAMILY MEDICINE

## 2022-03-21 PROCEDURE — 2022F DILAT RTA XM EVC RTNOPTHY: CPT | Performed by: FAMILY MEDICINE

## 2022-03-21 PROCEDURE — G8427 DOCREV CUR MEDS BY ELIG CLIN: HCPCS | Performed by: FAMILY MEDICINE

## 2022-03-21 SDOH — ECONOMIC STABILITY: FOOD INSECURITY: WITHIN THE PAST 12 MONTHS, THE FOOD YOU BOUGHT JUST DIDN'T LAST AND YOU DIDN'T HAVE MONEY TO GET MORE.: NEVER TRUE

## 2022-03-21 SDOH — ECONOMIC STABILITY: FOOD INSECURITY: WITHIN THE PAST 12 MONTHS, YOU WORRIED THAT YOUR FOOD WOULD RUN OUT BEFORE YOU GOT MONEY TO BUY MORE.: NEVER TRUE

## 2022-03-21 ASSESSMENT — PATIENT HEALTH QUESTIONNAIRE - PHQ9
SUM OF ALL RESPONSES TO PHQ QUESTIONS 1-9: 0
SUM OF ALL RESPONSES TO PHQ QUESTIONS 1-9: 0
2. FEELING DOWN, DEPRESSED OR HOPELESS: 0
1. LITTLE INTEREST OR PLEASURE IN DOING THINGS: 0
SUM OF ALL RESPONSES TO PHQ QUESTIONS 1-9: 0
SUM OF ALL RESPONSES TO PHQ9 QUESTIONS 1 & 2: 0
SUM OF ALL RESPONSES TO PHQ QUESTIONS 1-9: 0

## 2022-03-21 ASSESSMENT — SOCIAL DETERMINANTS OF HEALTH (SDOH): HOW HARD IS IT FOR YOU TO PAY FOR THE VERY BASICS LIKE FOOD, HOUSING, MEDICAL CARE, AND HEATING?: NOT HARD AT ALL

## 2022-03-21 NOTE — PATIENT INSTRUCTIONS
link.  Current as of: July 1, 2021               Content Version: 13.1  © 1459-0163 [employer]. Care instructions adapted under license by [facility] ([facility]). If you have questions about a medical condition or this instruction, always ask your healthcare professional. [address] any warranty or liability for your use of this information.

## 2022-03-21 NOTE — PROGRESS NOTES
General FM note    Rodolfo Velazco is a 43 y.o. female who presents today for follow up on her  medical conditions as noted below. Rodolfo Velazco is c/o of   Chief Complaint   Patient presents with    3 Month Follow-Up       Patient Active Problem List:     Dysmenorrhea     AUB-N     Adenomyosis     s/p RALH w/BS     Uncontrolled type 2 diabetes mellitus without complication, with long-term current use of insulin     Vaginal yeast infection     Family history of breast cancer     Family history of ovarian cancer     Past Medical History:   Diagnosis Date    DM (diabetes mellitus) ([facility] [address] 2016    DUB (dysfunctional uterine bleeding)     adenomyosis    H/O menorrhagia     History of blood transfusion     no reaction    Hx of blood clots     2001 left leg    Vitamin D deficiency       Past Surgical History:   Procedure Laterality Date    CERVIX LESION DESTRUCTION  1998    [place]ryo    HYSTERECTOMY  09/15/2016    robotic total hyst with bilateral salpingo    TONSILLECTOMY      TUBAL LIGATION  03/2009     Family History   Problem Relation Age of Onset    Diabetes Mother     Stroke Mother     Heart Disease Mother     High Blood Pressure Mother     Diabetes Father     Stroke Father     Heart Disease Father      Current Outpatient Medications   Medication Sig Dispense Refill    vitamin D (ERGOCALCIFEROL) 1.25 MG (10430 UT) CAPS capsule take 1 capsule by mouth every week 12 capsule 0    metFORMIN (GLUCOPHAGE-XR) 500 MG extended release tablet take 2 tablets by mouth twice a day with meals 60 tablet 2    gabapentin (NEURONTIN) 100 MG capsule Take 1 capsule by mouth 2 times daily for 180 days.  Intended supply: 30 days 60 capsule 5    Dulaglutide 0.75 MG/0.5ML SOPN Inject 1.5 mg into the skin every 7 days 4 pen 3    simvastatin (ZOCOR) 40 MG tablet take 1 tablet by mouth every evening 30 tablet 1    lisinopril (PRINIVIL;ZESTRIL) 5 MG tablet take 1/2 tablet by mouth once daily 15 tablet 3    TRUE METRIX BLOOD GLUCOSE TEST strip TEST ONCE A  strip 1    metroNIDAZOLE (METROGEL) 0.75 % gel apply topically to affected area twice a day 45 g 1    TRUEplus Lancets 30G MISC 1 each by Does not apply route daily 100 each 5    B-D UF III MINI PEN NEEDLES 31G X 5 MM MISC use twice a day 100 each 3    Blood Glucose Monitoring Suppl (TRUE METRIX METER) w/Device KIT 1 each by Does not apply route 3 times daily 1 kit 1    ibuprofen (ADVIL;MOTRIN) 600 MG tablet Take 1 tablet by mouth every 6 hours as needed for Pain 30 tablet 0    cetirizine (ZYRTEC) 10 MG tablet take 1 tablet by mouth once daily 30 tablet 3    albuterol sulfate (PROAIR RESPICLICK) 630 (90 Base) MCG/ACT aerosol powder inhalation Inhale 1 puff into the lungs every 6 hours as needed for Wheezing or Shortness of Breath 1 Inhaler 0    fluticasone (FLONASE) 50 MCG/ACT nasal spray 1 spray by Nasal route daily 1 Bottle 3    Blood Glucose Monitoring Suppl (FREESTYLE LITE) EDUARDO   0    Blood Glucose Monitoring Suppl (TRUERESULT BLOOD GLUCOSE) W/DEVICE KIT 1 kit by Does not apply route daily 1 kit 0     No current facility-administered medications for this visit. ALLERGIES:    Allergies   Allergen Reactions    Codeine Hives and Itching    Keflex [Cephalexin] Swelling    Penicillins Hives    Vicodin [Hydrocodone-Acetaminophen] Itching       Social History     Tobacco Use    Smoking status: Never Smoker    Smokeless tobacco: Never Used   Substance Use Topics    Alcohol use: Yes     Comment: rarely       Body mass index is 36.24 kg/m². /80   Pulse 73   Temp 97.5 °F (36.4 °C)   Ht 5' 5\" (1.651 m)   Wt 217 lb 12.8 oz (98.8 kg)   LMP 08/04/2016 (Exact Date)   SpO2 99%   BMI 36.24 kg/m²     Subjective:      HPI    43 y.o. female here for a FU. Fasting glucose: 91-86. 132. trulicity increased she feels that this has helped a lot. The patient still has been taking glyburide 2.5 mg a the day.   She feels that this might be too much because of fasting glucose levels are much lower than before. Again the 132 was the highest recently. She has not been taking gabapentin for the sciatic discomfort at her left leg. The medication makes her very tired. Review of Systems   Constitutional: Negative for fever and unexpected weight change. Pertinent items are noted in HPI. Objective:   Physical Exam  Constitutional: VS (see above). General appearance: normal development, habitus and attention, no deformities. No distress. Eyes: normal conjunctiva and lids. CAV: RRR, no RMG. No edema lower extremities. Pulmo: CTA bilateral, no CWR. Skin: no rashes, lesions or ulcers. Musculoskeletal: normal gait. Nails: no clubbing or cyanosis. Physical Exam  Musculoskeletal:        Legs:          Psychiatric: alert and oriented to place, time and person. Normal mood and affect. A1C: 6.6. Assessment:       Diagnosis Orders   1. Uncontrolled type 2 diabetes mellitus with hyperglycemia (HCC)  POCT glycosylated hemoglobin (Hb A1C)   2. Chronic sciatica, left         Plan:   A1c is quite improved. The patient will stop the glipizide 2.5 mg a day and will continue to check her sugars. She will see me back in 3 months. I discussed with her to taking 1 tablet of gabapentin at night. I did give her exercises. Return in about 3 months (around 6/21/2022), or if symptoms worsen or fail to improve, for DM II. Orders Placed This Encounter   Procedures    POCT glycosylated hemoglobin (Hb A1C)     No orders of the defined types were placed in this encounter. Call or return to clinic prn if these symptoms worsen or fail to improve as anticipated. I have reviewed the instructions with the patient, answering all questions to patient's satisfaction. Patriciorick Byrnes received counseling on the following healthy behaviors: nutrition, exercise, and medication adherence  Reviewed prior labs and health maintenance.   Continue current medications, diet and exercise. Discussed use, benefit, and side effects of prescribed medications. Barriers to medication compliance addressed. Patient given educational materials - see patient instructions. All patient questions answered. Patient voiced understanding.       Electronically signed by Don Richmond MD on 3/21/2022 at 9:28 AM       (Please note that portions of this note were completed with a voice recognition program. Efforts were made to edit the dictations but occasionally words are mis-transcribed.)

## 2022-03-23 RX ORDER — CALCIUM CITRATE/VITAMIN D3 200MG-6.25
TABLET ORAL
Qty: 100 STRIP | Refills: 1 | Status: SHIPPED | OUTPATIENT
Start: 2022-03-23

## 2022-05-11 DIAGNOSIS — E55.9 VITAMIN D DEFICIENCY: ICD-10-CM

## 2022-05-11 RX ORDER — ERGOCALCIFEROL 1.25 MG/1
CAPSULE ORAL
Qty: 12 CAPSULE | Refills: 0 | Status: SHIPPED | OUTPATIENT
Start: 2022-05-11 | End: 2022-08-02

## 2022-05-11 NOTE — TELEPHONE ENCOUNTER
Duy Recinos is calling to request a refill on the following medication(s):    Last Visit Date (If Applicable):  8/90/1151    Next Visit Date:    6/21/2022    Medication Request:  Requested Prescriptions     Pending Prescriptions Disp Refills    vitamin D (ERGOCALCIFEROL) 1.25 MG (54158 UT) CAPS capsule [Pharmacy Med Name: VITAMIN D2 1.25MG(50,000 UNIT)] 12 capsule 0     Sig: take 1 capsule by mouth every week

## 2022-05-16 RX ORDER — GLUCOSAM/CHON-MSM1/C/MANG/BOSW 500-416.6
TABLET ORAL
Qty: 100 EACH | Refills: 2 | Status: SHIPPED | OUTPATIENT
Start: 2022-05-16

## 2022-05-31 RX ORDER — DULAGLUTIDE 1.5 MG/.5ML
INJECTION, SOLUTION SUBCUTANEOUS
Qty: 2 ML | Refills: 3 | Status: SHIPPED | OUTPATIENT
Start: 2022-05-31 | End: 2022-10-03

## 2022-05-31 NOTE — TELEPHONE ENCOUNTER
Ruma Kamran is calling to request a refill on the following medication(s):    Last Visit Date (If Applicable):  5/11/2225    Next Visit Date:    6/13/2022    Medication Request:  Requested Prescriptions     Pending Prescriptions Disp Refills    TRULICITY 1.5 UI/1.7BT SOPN [Pharmacy Med Name: TRULICITY 1.5 BS/9.9 ML PEN] 2 mL      Sig: inject 0.5 milliliters ( 1 AND 1/2 milligrams ) subcutaneously every 7 days IN THE ABDOMEN THIGHS OR OUTER AREA OF UPPER ARM ROTATE INJECTION SITES

## 2022-06-21 ENCOUNTER — OFFICE VISIT (OUTPATIENT)
Dept: FAMILY MEDICINE CLINIC | Age: 42
End: 2022-06-21
Payer: COMMERCIAL

## 2022-06-21 VITALS
WEIGHT: 212.6 LBS | HEIGHT: 66 IN | TEMPERATURE: 97 F | OXYGEN SATURATION: 98 % | SYSTOLIC BLOOD PRESSURE: 118 MMHG | HEART RATE: 77 BPM | BODY MASS INDEX: 34.17 KG/M2 | DIASTOLIC BLOOD PRESSURE: 84 MMHG

## 2022-06-21 DIAGNOSIS — K21.9 GASTROESOPHAGEAL REFLUX DISEASE, UNSPECIFIED WHETHER ESOPHAGITIS PRESENT: ICD-10-CM

## 2022-06-21 DIAGNOSIS — E11.9 CONTROLLED TYPE 2 DIABETES MELLITUS WITHOUT COMPLICATION, WITHOUT LONG-TERM CURRENT USE OF INSULIN (HCC): Primary | ICD-10-CM

## 2022-06-21 DIAGNOSIS — K59.04 CHRONIC IDIOPATHIC CONSTIPATION: ICD-10-CM

## 2022-06-21 LAB — HBA1C MFR BLD: 6.3 %

## 2022-06-21 PROCEDURE — 1036F TOBACCO NON-USER: CPT | Performed by: FAMILY MEDICINE

## 2022-06-21 PROCEDURE — 99213 OFFICE O/P EST LOW 20 MIN: CPT | Performed by: FAMILY MEDICINE

## 2022-06-21 PROCEDURE — 3044F HG A1C LEVEL LT 7.0%: CPT | Performed by: FAMILY MEDICINE

## 2022-06-21 PROCEDURE — 83036 HEMOGLOBIN GLYCOSYLATED A1C: CPT | Performed by: FAMILY MEDICINE

## 2022-06-21 PROCEDURE — G8427 DOCREV CUR MEDS BY ELIG CLIN: HCPCS | Performed by: FAMILY MEDICINE

## 2022-06-21 PROCEDURE — G8417 CALC BMI ABV UP PARAM F/U: HCPCS | Performed by: FAMILY MEDICINE

## 2022-06-21 PROCEDURE — 2022F DILAT RTA XM EVC RTNOPTHY: CPT | Performed by: FAMILY MEDICINE

## 2022-06-21 RX ORDER — FAMOTIDINE 20 MG/1
20 TABLET, FILM COATED ORAL 2 TIMES DAILY
Qty: 60 TABLET | Refills: 3 | Status: SHIPPED | OUTPATIENT
Start: 2022-06-21

## 2022-06-21 RX ORDER — SENNA AND DOCUSATE SODIUM 50; 8.6 MG/1; MG/1
1 TABLET, FILM COATED ORAL DAILY
Qty: 30 TABLET | Refills: 0 | Status: SHIPPED | OUTPATIENT
Start: 2022-06-21

## 2022-06-21 RX ORDER — PSYLLIUM HUSK/CALCIUM CARB 1 G-60 MG
1 CAPSULE ORAL DAILY
Qty: 120 CAPSULE | Refills: 3 | Status: SHIPPED | OUTPATIENT
Start: 2022-06-21

## 2022-06-21 RX ORDER — METFORMIN HYDROCHLORIDE 500 MG/1
500 TABLET, EXTENDED RELEASE ORAL 2 TIMES DAILY
Qty: 120 TABLET | Refills: 3 | Status: SHIPPED | OUTPATIENT
Start: 2022-06-21 | End: 2022-08-25 | Stop reason: SDUPTHER

## 2022-06-21 ASSESSMENT — PATIENT HEALTH QUESTIONNAIRE - PHQ9
SUM OF ALL RESPONSES TO PHQ QUESTIONS 1-9: 0
SUM OF ALL RESPONSES TO PHQ QUESTIONS 1-9: 0
1. LITTLE INTEREST OR PLEASURE IN DOING THINGS: 0
2. FEELING DOWN, DEPRESSED OR HOPELESS: 0
SUM OF ALL RESPONSES TO PHQ9 QUESTIONS 1 & 2: 0
SUM OF ALL RESPONSES TO PHQ QUESTIONS 1-9: 0
SUM OF ALL RESPONSES TO PHQ QUESTIONS 1-9: 0

## 2022-06-21 NOTE — PROGRESS NOTES
LANCET to TEST BLOOD SUGAR once daily 100 each 2    vitamin D (ERGOCALCIFEROL) 1.25 MG (28687 UT) CAPS capsule take 1 capsule by mouth every week 12 capsule 0    TRUE METRIX BLOOD GLUCOSE TEST strip TEST ONCE A  strip 1    Dulaglutide 0.75 MG/0.5ML SOPN Inject 1.5 mg into the skin every 7 days 4 pen 3    simvastatin (ZOCOR) 40 MG tablet take 1 tablet by mouth every evening 30 tablet 1    lisinopril (PRINIVIL;ZESTRIL) 5 MG tablet take 1/2 tablet by mouth once daily 15 tablet 3    metroNIDAZOLE (METROGEL) 0.75 % gel apply topically to affected area twice a day 45 g 1    B-D UF III MINI PEN NEEDLES 31G X 5 MM MISC use twice a day 100 each 3    Blood Glucose Monitoring Suppl (TRUE METRIX METER) w/Device KIT 1 each by Does not apply route 3 times daily 1 kit 1    ibuprofen (ADVIL;MOTRIN) 600 MG tablet Take 1 tablet by mouth every 6 hours as needed for Pain 30 tablet 0    cetirizine (ZYRTEC) 10 MG tablet take 1 tablet by mouth once daily 30 tablet 3    albuterol sulfate (PROAIR RESPICLICK) 295 (90 Base) MCG/ACT aerosol powder inhalation Inhale 1 puff into the lungs every 6 hours as needed for Wheezing or Shortness of Breath 1 Inhaler 0    fluticasone (FLONASE) 50 MCG/ACT nasal spray 1 spray by Nasal route daily 1 Bottle 3    Blood Glucose Monitoring Suppl (FREESTYLE LITE) EDUARDO   0    Blood Glucose Monitoring Suppl (TRUERESULT BLOOD GLUCOSE) W/DEVICE KIT 1 kit by Does not apply route daily 1 kit 0    gabapentin (NEURONTIN) 100 MG capsule Take 1 capsule by mouth 2 times daily for 180 days. Intended supply: 30 days 60 capsule 5     No current facility-administered medications for this visit.      ALLERGIES:    Allergies   Allergen Reactions    Codeine Hives and Itching    Keflex [Cephalexin] Swelling    Penicillins Hives    Vicodin [Hydrocodone-Acetaminophen] Itching       Social History     Tobacco Use    Smoking status: Never Smoker    Smokeless tobacco: Never Used   Substance Use Topics    Alcohol use: Yes     Comment: rarely       Body mass index is 34.31 kg/m². /84   Pulse 77   Temp 97 °F (36.1 °C)   Ht 5' 6\" (1.676 m)   Wt 212 lb 9.6 oz (96.4 kg)   LMP 08/04/2016 (Exact Date)   SpO2 98%   BMI 34.31 kg/m²     Subjective:      HPI    43 y.o. female coming in today with 2 concerns. Constant heart burn what ever she eats. She states this was started couple weeks ago. But she states she also feels very full overall. She feels that her stomach is always bloated. And that she has some bowel issues. She feels backed up for a day or 2 and then she feels even off and using the bathroom that she is still full. She states that she has been having some loose stools in between. She complains about increased belching burping and again some epigastric discomfort does not matter what she eats. Has been taking the Trulicity as prescribed as well as metformin. Also is on a statin and on ACE inhibitor. 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 Review of Systems   Constitutional: Negative for fever and unexpected weight change. Pertinent items are noted in HPI. Objective:   Physical Exam  Constitutional: VS (see above). General appearance: normal development, habitus and attention, no deformities. No distress. Eyes: normal conjunctiva and lids. CAV: RRR, no RMG. No edema lower extremities. Pulmo: CTA bilateral, no CWR. Skin: no rashes, lesions or ulcers. Musculoskeletal: normal gait. Nails: no clubbing or cyanosis. Psychiatric: alert and oriented to place, time and person. Normal mood and affect. A1C: 6.3. Assessment:       Diagnosis Orders   1. Controlled type 2 diabetes mellitus without complication, without long-term current use of insulin ([facility])  POCT glycosylated hemoglobin (Hb A1C)    metFORMIN (GLUCOPHAGE-XR) 500 MG extended release tablet    Lipid Panel    Microalbumin, Ur   2. Chronic idiopathic constipation     3.  Gastroesophageal reflux disease, unspecified whether esophagitis present         Plan:   Patient's diabetes is quite well controlled. I discussed with her that she probably has some acid reflux more so due to her bloating due to the constipation. Please start a fiber supplement take it every day at least for 4 weeks. I also did call in Alexia-Colace to pharmacy to use if needed. Start the Pepcid. Read the information material provided about GERD and constipation. Return in about 6 months (around 12/21/2022), or if symptoms worsen or fail to improve, for DM II. Orders Placed This Encounter   Procedures    Lipid Panel     Standing Status:   Future     Standing Expiration Date:   6/21/2023     Order Specific Question:   Is Patient Fasting?/# of Hours     Answer:   yes    Microalbumin, Ur     Standing Status:   Future     Standing Expiration Date:   6/21/2023    POCT glycosylated hemoglobin (Hb A1C)     Orders Placed This Encounter   Medications    metFORMIN (GLUCOPHAGE-XR) 500 MG extended release tablet     Sig: Take 1 tablet by mouth in the morning and at bedtime     Dispense:  120 tablet     Refill:  3    famotidine (PEPCID) 20 MG tablet     Sig: Take 1 tablet by mouth 2 times daily     Dispense:  60 tablet     Refill:  3    Psyllium-Calcium (METAMUCIL PLUS CALCIUM) CAPS     Sig: Take 1 capsule by mouth daily Take with 8 oz water. Dispense:  120 capsule     Refill:  3    sennosides-docusate sodium (SENOKOT-S) 8.6-50 MG tablet     Sig: Take 1 tablet by mouth daily     Dispense:  30 tablet     Refill:  0       Call or return to clinic prn if these symptoms worsen or fail to improve as anticipated. I have reviewed the instructions with the patient, answering all questions to patient's satisfaction. Esperanza received counseling on the following healthy behaviors: nutrition, exercise, and medication adherence  Reviewed prior labs and health maintenance. Continue current medications, diet and exercise.   Discussed use, benefit, and side effects of prescribed medications. Barriers to medication compliance addressed. Patient given educational materials - see patient instructions. All patient questions answered. Patient voiced understanding.       Electronically signed by Carlitos Talley MD on 6/21/2022 at 10:55 AM       (Please note that portions of this note were completed with a voice recognition program. Efforts were made to edit the dictations but occasionally words are mis-transcribed.)

## 2022-06-21 NOTE — PATIENT INSTRUCTIONS
Patient Education      Patient Education        Constipation: Care Instructions  Overview     Constipation means that you have a hard time passing stools (bowel movements). People pass stools from 3 times a day to once every 3 days. What is normal for you may be different. Constipation may occur with pain in the rectum and cramping. The pain may get worse when you try to pass stools. Sometimes there are small amounts of bright red blood on toilet paper or the surface of stools. This is because of enlarged veins near the rectum (hemorrhoids). A few changes in your diet and lifestyle may help you avoid ongoingconstipation. Your doctor may also prescribe medicine to help loosen your stool. Some medicines can cause constipation. These include pain medicines and antidepressants. Tell your doctor about all the medicines you take. Your doctormay want to make a medicine change to ease your symptoms. Follow-up care is a key part of your treatment and safety. Be sure to make and go to all appointments, and call your doctor if you are having problems. It's also a good idea to know your test results and keep alist of the medicines you take. How can you care for yourself at home?  Drink plenty of fluids. If you have kidney, heart, or liver disease and have to limit fluids, talk with your doctor before you increase the amount of fluids you drink.  Include high-fiber foods in your diet each day. These include fruits, vegetables, beans, and whole grains.  Get at least 30 minutes of exercise on most days of the week. Walking is a good choice. You also may want to do other activities, such as running, swimming, cycling, or playing tennis or team sports.  Take a fiber supplement, such as Citrucel or Metamucil, every day. Read and follow all instructions on the label.  Schedule time each day for a bowel movement. A daily routine may help.  Take your time having a bowel movement, but don't sit for more than 10 minutes at a time. And don't strain too much.  Support your feet with a small step stool when you sit on the toilet. This helps flex your hips and places your pelvis in a squatting position.  Your doctor may recommend an over-the-counter laxative to relieve your constipation. Examples are Milk of Magnesia and MiraLax. Read and follow all instructions on the label. Do not use laxatives on a long-term basis. When should you call for help? Call your doctor now or seek immediate medical care if:     You have new or worse belly pain.      You have new or worse nausea or vomiting.      You have blood in your stools. Watch closely for changes in your health, and be sure to contact your doctor if:     Your constipation is getting worse.      You do not get better as expected. Where can you learn more? Go to https://Reapplixpemieshaeb.[employer]ek [email or web address]. org and sign in to your Bazari account. Enter [phone] in the DocRun box to learn more about \"Constipation: Care Instructions. \"     If you do not have an account, please click on the \"Sign Up Now\" link. Current as of: March 9, 2022               Content Version: 13.3  © 4503-5031 [employer] Care instructions adapted under license by [facility] ([facility]). If you have questions about a medical condition or this instruction, always ask your healthcare professional. [place] [address] any warranty or liability for your use of this information. Gastroesophageal Reflux Disease (GERD): Care Instructions  Overview     Gastroesophageal reflux disease (GERD) is the backward flow of stomach acid into the esophagus. The esophagus is the tube that leads from your throat to your stomach. A one-way valve prevents the stomach acid from backing up into this tube. But when you have GERD, this valve does not close tightly enough. This can also cause pain and swelling in your esophagus.  (This is calledesophagitis.)  If you have mild GERD symptoms including heartburn, you may be able to control the problem with antacids or over-the-counter medicine. You can also make lifestyle changes to help reduce your symptoms. These include changing yourdiet and eating habits, such as not eating late at night and losing weight. Follow-up care is a key part of your treatment and safety. Be sure to make and go to all appointments, and call your doctor if you are having problems. It's also a good idea to know your test results and keep alist of the medicines you take. How can you care for yourself at home?  Take your medicines exactly as prescribed. Call your doctor if you think you are having a problem with your medicine.  Your doctor may recommend over-the-counter medicine. For mild or occasional indigestion, antacids, such as Tums, Gaviscon, Mylanta, or Maalox, may help. Your doctor also may recommend over-the-counter acid reducers, such as famotidine (Pepcid AC), cimetidine (Tagamet HB), or omeprazole (Prilosec). Read and follow all instructions on the label. If you use these medicines often, talk with your doctor.  Change your eating habits. ? It's best to eat several small meals instead of two or three large meals. ? After you eat, wait 2 to 3 hours before you lie down. ? Avoid foods that make your symptoms worse. These may include chocolate, mint, alcohol, pepper, spicy foods, high-fat foods, or drinks with caffeine in them, such as tea, coffee, dung, or energy drinks. If your symptoms are worse after you eat a certain food, you may want to stop eating it to see if your symptoms get better.  Do not smoke or chew tobacco. Smoking can make GERD worse. If you need help quitting, talk to your doctor about stop-smoking programs and medicines. These can increase your chances of quitting for good.    If you have GERD symptoms at night, raise the head of your bed 6 to 8 inches by putting the frame on blocks or placing a foam wedge under the head of your mattress. (Adding extra pillows does not work.)   Do not wear tight clothing around your middle.  Lose weight if you need to. Losing just 5 to 10 pounds can help. When should you call for help? Call your doctor now or seek immediate medical care if:     You have new or different belly pain.      Your stools are black and tarlike or have streaks of blood. Watch closely for changes in your health, and be sure to contact your doctor if:     Your symptoms have not improved after 2 days.      Food seems to catch in your throat or chest.   Where can you learn more? Go to https://TinteopeSIGFOXeb.Singly. org and sign in to your StyleSeek account. Enter A888 in the [employer] [place] box to learn more about \"Gastroesophageal Reflux Disease (GERD): Care Instructions. \"     If you do not have an account, please click on the \"Sign Up Now\" link. Current as of: September 8, 2021               Content Version: 13.3  © 2006-2022 [employer]. Care instructions adapted under license by [facility] ([facility]). If you have questions about a medical condition or this instruction, always ask your healthcare professional. [place] [address] any warranty or liability for your use of this information.

## 2022-06-24 ENCOUNTER — HOSPITAL ENCOUNTER (OUTPATIENT)
Age: 42
Setting detail: SPECIMEN
Discharge: HOME | End: 2022-06-24

## 2022-06-24 DIAGNOSIS — E11.9 CONTROLLED TYPE 2 DIABETES MELLITUS WITHOUT COMPLICATION, WITHOUT LONG-TERM CURRENT USE OF INSULIN (HCC): ICD-10-CM

## 2022-06-24 LAB
CHOLESTEROL/HDL RATIO: 3.5
CHOLESTEROL: 166 MG/DL
CREATININE URINE: 108.3 MG/DL (ref 28–217)
HDLC SERPL-MCNC: 48 MG/DL
LDL CHOLESTEROL: 100 MG/DL (ref 0–130)
MICROALBUMIN/CREAT 24H UR: <12 MG/L
MICROALBUMIN/CREAT UR-RTO: NORMAL MCG/MG CREAT
TRIGL SERPL-MCNC: 90 MG/DL

## 2022-08-02 DIAGNOSIS — E55.9 VITAMIN D DEFICIENCY: ICD-10-CM

## 2022-08-02 RX ORDER — ERGOCALCIFEROL 1.25 MG/1
CAPSULE ORAL
Qty: 12 CAPSULE | Refills: 0 | Status: SHIPPED | OUTPATIENT
Start: 2022-08-02 | End: 2022-10-25

## 2022-08-25 DIAGNOSIS — E11.9 CONTROLLED TYPE 2 DIABETES MELLITUS WITHOUT COMPLICATION, WITHOUT LONG-TERM CURRENT USE OF INSULIN (HCC): ICD-10-CM

## 2022-08-25 RX ORDER — METFORMIN HYDROCHLORIDE 500 MG/1
500 TABLET, EXTENDED RELEASE ORAL 2 TIMES DAILY
Qty: 120 TABLET | Refills: 3 | Status: SHIPPED | OUTPATIENT
Start: 2022-08-25

## 2022-08-25 NOTE — TELEPHONE ENCOUNTER
Suzanne Patel is calling to request a refill on the following medication(s):    Last Visit Date (If Applicable):  4/51/6590    Next Visit Date:    9/20/2022    Medication Request:  Requested Prescriptions     Pending Prescriptions Disp Refills    metFORMIN (GLUCOPHAGE-XR) 500 MG extended release tablet 120 tablet 3     Sig: Take 1 tablet by mouth in the morning and at bedtime

## 2022-09-21 ENCOUNTER — TELEPHONE (OUTPATIENT)
Dept: FAMILY MEDICINE CLINIC | Age: 42
End: 2022-09-21

## 2022-10-02 DIAGNOSIS — M54.32 CHRONIC SCIATICA, LEFT: ICD-10-CM

## 2022-10-03 RX ORDER — DULAGLUTIDE 1.5 MG/.5ML
INJECTION, SOLUTION SUBCUTANEOUS
Qty: 2 ML | Refills: 3 | Status: SHIPPED | OUTPATIENT
Start: 2022-10-03

## 2022-10-03 RX ORDER — GABAPENTIN 100 MG/1
CAPSULE ORAL
Qty: 60 CAPSULE | Refills: 5 | Status: SHIPPED | OUTPATIENT
Start: 2022-10-03 | End: 2022-11-02

## 2022-10-18 DIAGNOSIS — L71.9 ACNE ROSACEA: ICD-10-CM

## 2022-10-20 RX ORDER — METRONIDAZOLE 7.5 MG/G
GEL TOPICAL
Qty: 45 G | Refills: 1 | Status: SHIPPED | OUTPATIENT
Start: 2022-10-20

## 2022-10-24 DIAGNOSIS — E55.9 VITAMIN D DEFICIENCY: ICD-10-CM

## 2022-10-25 RX ORDER — ERGOCALCIFEROL 1.25 MG/1
CAPSULE ORAL
Qty: 12 CAPSULE | Refills: 0 | Status: SHIPPED | OUTPATIENT
Start: 2022-10-25

## 2022-12-15 ENCOUNTER — OFFICE VISIT (OUTPATIENT)
Dept: FAMILY MEDICINE CLINIC | Age: 42
End: 2022-12-15
Payer: COMMERCIAL

## 2022-12-15 VITALS
TEMPERATURE: 97.9 F | HEIGHT: 66 IN | OXYGEN SATURATION: 99 % | SYSTOLIC BLOOD PRESSURE: 109 MMHG | BODY MASS INDEX: 33.75 KG/M2 | WEIGHT: 210 LBS | HEART RATE: 72 BPM | DIASTOLIC BLOOD PRESSURE: 72 MMHG

## 2022-12-15 DIAGNOSIS — E11.9 CONTROLLED TYPE 2 DIABETES MELLITUS WITHOUT COMPLICATION, WITHOUT LONG-TERM CURRENT USE OF INSULIN (HCC): Primary | ICD-10-CM

## 2022-12-15 DIAGNOSIS — M54.32 CHRONIC SCIATICA, LEFT: ICD-10-CM

## 2022-12-15 LAB — HBA1C MFR BLD: 7.5 %

## 2022-12-15 PROCEDURE — 3051F HG A1C>EQUAL 7.0%<8.0%: CPT | Performed by: FAMILY MEDICINE

## 2022-12-15 PROCEDURE — 2022F DILAT RTA XM EVC RTNOPTHY: CPT | Performed by: FAMILY MEDICINE

## 2022-12-15 PROCEDURE — G8417 CALC BMI ABV UP PARAM F/U: HCPCS | Performed by: FAMILY MEDICINE

## 2022-12-15 PROCEDURE — 1036F TOBACCO NON-USER: CPT | Performed by: FAMILY MEDICINE

## 2022-12-15 PROCEDURE — 83036 HEMOGLOBIN GLYCOSYLATED A1C: CPT | Performed by: FAMILY MEDICINE

## 2022-12-15 PROCEDURE — G8484 FLU IMMUNIZE NO ADMIN: HCPCS | Performed by: FAMILY MEDICINE

## 2022-12-15 PROCEDURE — 99213 OFFICE O/P EST LOW 20 MIN: CPT | Performed by: FAMILY MEDICINE

## 2022-12-15 PROCEDURE — G8427 DOCREV CUR MEDS BY ELIG CLIN: HCPCS | Performed by: FAMILY MEDICINE

## 2022-12-15 RX ORDER — METFORMIN HYDROCHLORIDE 500 MG/1
500 TABLET, EXTENDED RELEASE ORAL 2 TIMES DAILY
Qty: 120 TABLET | Refills: 3 | Status: SHIPPED | OUTPATIENT
Start: 2022-12-15

## 2022-12-15 ASSESSMENT — PATIENT HEALTH QUESTIONNAIRE - PHQ9
1. LITTLE INTEREST OR PLEASURE IN DOING THINGS: 0
SUM OF ALL RESPONSES TO PHQ QUESTIONS 1-9: 0
SUM OF ALL RESPONSES TO PHQ QUESTIONS 1-9: 0
SUM OF ALL RESPONSES TO PHQ9 QUESTIONS 1 & 2: 0
SUM OF ALL RESPONSES TO PHQ QUESTIONS 1-9: 0
2. FEELING DOWN, DEPRESSED OR HOPELESS: 0
SUM OF ALL RESPONSES TO PHQ QUESTIONS 1-9: 0

## 2022-12-15 NOTE — PROGRESS NOTES
General FM note    Meghan Shannon is a 43 y.o. female who presents today for follow up on her  medical conditions as noted below.   Meghan Shannon is c/o of   Chief Complaint   Patient presents with    Diabetes       Patient Active Problem List:     Dysmenorrhea     AUB-N     Adenomyosis     s/p RALH w/BS     Uncontrolled type 2 diabetes mellitus without complication, with long-term current use of insulin     Vaginal yeast infection     Family history of breast cancer     Family history of ovarian cancer     Past Medical History:   Diagnosis Date    DM (diabetes mellitus) ([facility] [address] 2016    DUB (dysfunctional uterine bleeding)     adenomyosis    H/O menorrhagia     History of blood transfusion     no reaction    Hx of blood clots     2001 left leg    Vitamin D deficiency       Past Surgical History:   Procedure Laterality Date    CERVIX LESION DESTRUCTION  1998    Cryo    HYSTERECTOMY  09/15/2016    robotic total hyst with bilateral salpingo    TONSILLECTOMY      TUBAL LIGATION  03/2009     Family History   Problem Relation Age of Onset    Diabetes Mother     Stroke Mother     Heart Disease Mother     High Blood Pressure Mother     Diabetes Father     Stroke Father     Heart Disease Father      Current Outpatient Medications   Medication Sig Dispense Refill    metFORMIN (GLUCOPHAGE-XR) 500 MG extended release tablet Take 1 tablet by mouth in the morning and at bedtime 120 tablet 3    vitamin D (ERGOCALCIFEROL) 1.25 MG (51667 UT) CAPS capsule take 1 capsule by mouth every week 12 capsule 0    metroNIDAZOLE (METROGEL) 0.75 % gel apply topically to affected area twice a day 45 g 1    TRULICITY 1.5 KX/7.9DH SOPN inject 0.5 milliliters ( 1 AND 1/2 milligrams ) subcutaneously every 7 days IN THE ABDOMEN THIGHS OR OUTER AREA OF UPPER ARM ROTATE INJECTION SITES 2 mL 3    famotidine (PEPCID) 20 MG tablet Take 1 tablet by mouth 2 times daily 60 tablet 3    Psyllium-Calcium (METAMUCIL PLUS CALCIUM) CAPS Take 1 capsule by mouth daily Take with 8 oz water. 120 capsule 3    sennosides-docusate sodium (SENOKOT-S) 8.6-50 MG tablet Take 1 tablet by mouth daily 30 tablet 0    TRUEplus Lancets 30G MISC use 1 LANCET to TEST BLOOD SUGAR once daily 100 each 2    TRUE METRIX BLOOD GLUCOSE TEST strip TEST ONCE A  strip 1    Dulaglutide 0.75 MG/0.5ML SOPN Inject 1.5 mg into the skin every 7 days 4 pen 3    simvastatin (ZOCOR) 40 MG tablet take 1 tablet by mouth every evening 30 tablet 1    lisinopril (PRINIVIL;ZESTRIL) 5 MG tablet take 1/2 tablet by mouth once daily 15 tablet 3    B-D UF III MINI PEN NEEDLES 31G X 5 MM MISC use twice a day 100 each 3    Blood Glucose Monitoring Suppl (TRUE METRIX METER) w/Device KIT 1 each by Does not apply route 3 times daily 1 kit 1    ibuprofen (ADVIL;MOTRIN) 600 MG tablet Take 1 tablet by mouth every 6 hours as needed for Pain 30 tablet 0    cetirizine (ZYRTEC) 10 MG tablet take 1 tablet by mouth once daily 30 tablet 3    albuterol sulfate (PROAIR RESPICLICK) 341 (90 Base) MCG/ACT aerosol powder inhalation Inhale 1 puff into the lungs every 6 hours as needed for Wheezing or Shortness of Breath 1 Inhaler 0    fluticasone (FLONASE) 50 MCG/ACT nasal spray 1 spray by Nasal route daily 1 Bottle 3    Blood Glucose Monitoring Suppl (FREESTYLE LITE) EDUARDO   0    Blood Glucose Monitoring Suppl (TRUERESULT BLOOD GLUCOSE) W/DEVICE KIT 1 kit by Does not apply route daily 1 kit 0    gabapentin (NEURONTIN) 100 MG capsule take 1 capsule by mouth twice a day 60 capsule 5     No current facility-administered medications for this visit. ALLERGIES:    Allergies   Allergen Reactions    Codeine Hives and Itching    Keflex [Cephalexin] Swelling    Penicillins Hives    Vicodin [Hydrocodone-Acetaminophen] Itching       Social History     Tobacco Use    Smoking status: Never    Smokeless tobacco: Never   Substance Use Topics    Alcohol use: Yes     Comment: rarely       Body mass index is 33.89 kg/m².   /72   Pulse 72 Temp 97.9 °F (36.6 °C)   Ht 5' 6\" (1.676 m)   Wt 210 lb (95.3 kg)   LMP 08/04/2016 (Exact Date)   SpO2 99%   BMI 33.89 kg/m²     Subjective:      HPI    43 y.o. female coming today for follow-up of her diabetes mellitus type 2. Her last A1c was 6.3 just 3 months ago. She has been using Trulicity 1.5 mg weekly. Did have problems with [facility]. She tells me that she has not been getting the metformin as prescribed. She has been working a lot. She can make her own schedule. Sciatic pain- L leg - spasms. Gabapentin has helped - but progressing. Feels that with the walking throughout hospital and not able to sit down the pain is getting worse. She has not seen physical therapy in the past.    Review of Systems   Constitutional: Negative for fever and unexpected weight change. Pertinent items are noted in HPI. Objective:   Physical Exam  Constitutional: VS (see above). General appearance: normal development, habitus and attention, no deformities. No distress. Eyes: normal conjunctiva and lids. CAV: RRR, no RMG. No edema lower extremities. Pulmo: CTA bilateral, no CWR. Skin: no rashes, lesions or ulcers. Musculoskeletal: normal gait. Nails: no clubbing or cyanosis. Psychiatric: alert and oriented to place, time and person. Normal mood and affect. A1C: 7.5. Assessment:       Diagnosis Orders   1. Controlled type 2 diabetes mellitus without complication, without long-term current use of insulin ([facility])  POCT glycosylated hemoglobin (Hb A1C)    metFORMIN (GLUCOPHAGE-XR) 500 MG extended release tablet      2. Chronic sciatica, left  [facility] [place]          Plan:   A1c slightly higher than previously. I discussed with her maybe to change to pharmacy if we did. Metformin called in. The patient will continue exercise and diet. She will continue gabapentin which has helped but I also will send her to physical therapy.   Hopefully the exercises can help her take care of the sciatic pain. Return in about 3 months (around 3/15/2023), or if symptoms worsen or fail to improve, for DM II. Orders Placed This Encounter   Procedures    [facility]     Referral Priority:   Routine     Referral Type:   Eval and Treat     Referral Reason:   Specialty Services Required     Requested Specialty:   Physical Therapist     Number of Visits Requested:   1    POCT glycosylated hemoglobin (Hb A1C)     Orders Placed This Encounter   Medications    metFORMIN (GLUCOPHAGE-XR) 500 MG extended release tablet     Sig: Take 1 tablet by mouth in the morning and at bedtime     Dispense:  120 tablet     Refill:  3       Call or return to clinic prn if these symptoms worsen or fail to improve as anticipated. I have reviewed the instructions with the patient, answering all questions to patient's satisfaction. Enid Naqvi received counseling on the following healthy behaviors: nutrition, exercise, and medication adherence  Reviewed prior labs and health maintenance. Continue current medications, diet and exercise. Discussed use, benefit, and side effects of prescribed medications. Barriers to medication compliance addressed. Patient given educational materials - see patient instructions. All patient questions answered. Patient voiced understanding.       Electronically signed by Zelalem Kearney MD on 12/15/2022 at 9:07 AM       (Please note that portions of this note were completed with a voice recognition program. Efforts were made to edit the dictations but occasionally words are mis-transcribed.)

## 2022-12-19 RX ORDER — CALCIUM CITRATE/VITAMIN D3 200MG-6.25
TABLET ORAL
Qty: 100 STRIP | Refills: 1 | Status: SHIPPED | OUTPATIENT
Start: 2022-12-19

## 2022-12-27 RX ORDER — FAMOTIDINE 20 MG/1
TABLET, FILM COATED ORAL
Qty: 60 TABLET | Refills: 3 | Status: SHIPPED | OUTPATIENT
Start: 2022-12-27

## 2023-01-17 DIAGNOSIS — E55.9 VITAMIN D DEFICIENCY: ICD-10-CM

## 2023-01-17 RX ORDER — ERGOCALCIFEROL 1.25 MG/1
CAPSULE ORAL
Qty: 12 CAPSULE | Refills: 0 | Status: SHIPPED | OUTPATIENT
Start: 2023-01-17

## 2023-01-17 NOTE — TELEPHONE ENCOUNTER
Donnell Flores is calling to request a refill on the following medication(s):    Last Visit Date (If Applicable):  79/86/2651    Next Visit Date:    3/15/2023    Medication Request:  Requested Prescriptions     Pending Prescriptions Disp Refills    vitamin D (ERGOCALCIFEROL) 1.25 MG (34425 UT) CAPS capsule [Pharmacy Med Name: VITAMIN D2 1.25MG(50,000 UNIT)] 12 capsule 0     Sig: take 1 capsule by mouth every week

## 2023-03-13 RX ORDER — DULAGLUTIDE 1.5 MG/.5ML
INJECTION, SOLUTION SUBCUTANEOUS
Qty: 2 ML | Refills: 3 | Status: SHIPPED | OUTPATIENT
Start: 2023-03-13

## 2023-03-13 NOTE — TELEPHONE ENCOUNTER
Darlyn Kolb is calling to request a refill on the following medication(s):    Last Visit Date (If Applicable):  Visit date not found    Next Visit Date:    Visit date not found    Medication Request:  Requested Prescriptions     Pending Prescriptions Disp Refills    TRULICITY 1.5 PO/1.2LY SC injection [Pharmacy Med Name: TRULICITY 1.5 ZJ/2.5 ML PEN] 2 mL 3     Sig: inject 0.5 milliliters ( 1 AND 1/2 milligrams ) subcutaneously every 7 days IN THE ABDOMEN THIGHS OR OUTER AREA OF UPPER ARM ROTATE INJECTION SITES

## 2023-03-16 ENCOUNTER — TELEPHONE (OUTPATIENT)
Dept: FAMILY MEDICINE CLINIC | Age: 43
End: 2023-03-16

## 2023-04-23 DIAGNOSIS — E55.9 VITAMIN D DEFICIENCY: ICD-10-CM

## 2023-04-24 RX ORDER — ERGOCALCIFEROL 1.25 MG/1
CAPSULE ORAL
Qty: 12 CAPSULE | Refills: 0 | Status: SHIPPED | OUTPATIENT
Start: 2023-04-24

## 2023-05-31 RX ORDER — FAMOTIDINE 20 MG/1
TABLET, FILM COATED ORAL
Qty: 60 TABLET | Refills: 3 | Status: SHIPPED | OUTPATIENT
Start: 2023-05-31

## 2023-06-01 ENCOUNTER — OFFICE VISIT (OUTPATIENT)
Dept: FAMILY MEDICINE CLINIC | Age: 43
End: 2023-06-01
Payer: COMMERCIAL

## 2023-06-01 ENCOUNTER — HOSPITAL ENCOUNTER (OUTPATIENT)
Age: 43
Setting detail: SPECIMEN
Discharge: HOME | End: 2023-06-01

## 2023-06-01 VITALS
HEART RATE: 94 BPM | TEMPERATURE: 97.5 F | OXYGEN SATURATION: 99 % | WEIGHT: 204.2 LBS | DIASTOLIC BLOOD PRESSURE: 84 MMHG | BODY MASS INDEX: 32.96 KG/M2 | SYSTOLIC BLOOD PRESSURE: 113 MMHG

## 2023-06-01 DIAGNOSIS — K59.04 CHRONIC IDIOPATHIC CONSTIPATION: ICD-10-CM

## 2023-06-01 DIAGNOSIS — E11.9 CONTROLLED TYPE 2 DIABETES MELLITUS WITHOUT COMPLICATION, WITHOUT LONG-TERM CURRENT USE OF INSULIN (HCC): ICD-10-CM

## 2023-06-01 DIAGNOSIS — E11.9 CONTROLLED TYPE 2 DIABETES MELLITUS WITHOUT COMPLICATION, WITHOUT LONG-TERM CURRENT USE OF INSULIN (HCC): Primary | ICD-10-CM

## 2023-06-01 LAB
EST. AVERAGE GLUCOSE BLD GHB EST-MCNC: 157 MG/DL
HBA1C MFR BLD: 7.1 % (ref 4–6)

## 2023-06-01 PROCEDURE — G8417 CALC BMI ABV UP PARAM F/U: HCPCS | Performed by: FAMILY MEDICINE

## 2023-06-01 PROCEDURE — 2022F DILAT RTA XM EVC RTNOPTHY: CPT | Performed by: FAMILY MEDICINE

## 2023-06-01 PROCEDURE — G8427 DOCREV CUR MEDS BY ELIG CLIN: HCPCS | Performed by: FAMILY MEDICINE

## 2023-06-01 PROCEDURE — 4004F PT TOBACCO SCREEN RCVD TLK: CPT | Performed by: FAMILY MEDICINE

## 2023-06-01 PROCEDURE — 99214 OFFICE O/P EST MOD 30 MIN: CPT | Performed by: FAMILY MEDICINE

## 2023-06-01 PROCEDURE — 3046F HEMOGLOBIN A1C LEVEL >9.0%: CPT | Performed by: FAMILY MEDICINE

## 2023-06-01 RX ORDER — SENNA AND DOCUSATE SODIUM 50; 8.6 MG/1; MG/1
2 TABLET, FILM COATED ORAL DAILY
Qty: 20 TABLET | Refills: 0 | Status: SHIPPED | OUTPATIENT
Start: 2023-06-01

## 2023-06-01 SDOH — ECONOMIC STABILITY: FOOD INSECURITY: WITHIN THE PAST 12 MONTHS, THE FOOD YOU BOUGHT JUST DIDN'T LAST AND YOU DIDN'T HAVE MONEY TO GET MORE.: NEVER TRUE

## 2023-06-01 SDOH — ECONOMIC STABILITY: FOOD INSECURITY: WITHIN THE PAST 12 MONTHS, YOU WORRIED THAT YOUR FOOD WOULD RUN OUT BEFORE YOU GOT MONEY TO BUY MORE.: NEVER TRUE

## 2023-06-01 SDOH — ECONOMIC STABILITY: HOUSING INSECURITY
IN THE LAST 12 MONTHS, WAS THERE A TIME WHEN YOU DID NOT HAVE A STEADY PLACE TO SLEEP OR SLEPT IN A SHELTER (INCLUDING NOW)?: NO

## 2023-06-01 SDOH — ECONOMIC STABILITY: INCOME INSECURITY: HOW HARD IS IT FOR YOU TO PAY FOR THE VERY BASICS LIKE FOOD, HOUSING, MEDICAL CARE, AND HEATING?: NOT HARD AT ALL

## 2023-06-01 ASSESSMENT — PATIENT HEALTH QUESTIONNAIRE - PHQ9
1. LITTLE INTEREST OR PLEASURE IN DOING THINGS: 0
2. FEELING DOWN, DEPRESSED OR HOPELESS: 0
SUM OF ALL RESPONSES TO PHQ QUESTIONS 1-9: 0
SUM OF ALL RESPONSES TO PHQ9 QUESTIONS 1 & 2: 0
SUM OF ALL RESPONSES TO PHQ QUESTIONS 1-9: 0

## 2023-06-01 NOTE — PROGRESS NOTES
General FM note    Juan Francisco Doctor is a 37 y.o. female who presents today for follow up on her  medical conditions as noted below.   Juan Francisco Doctor is c/o of   Chief Complaint   Patient presents with    Diabetes       Patient Active Problem List:     Dysmenorrhea     AUB-N     Adenomyosis     s/p RALFER w/BS     Uncontrolled type 2 diabetes mellitus without complication, with long-term current use of insulin     Vaginal yeast infection     Family history of breast cancer     Family history of ovarian cancer     Past Medical History:   Diagnosis Date    DM (diabetes mellitus) ([facility] [address] 2016    DUB (dysfunctional uterine bleeding)     adenomyosis    H/O menorrhagia     History of blood transfusion     no reaction    Hx of blood clots     2001 left leg    Vitamin D deficiency       Past Surgical History:   Procedure Laterality Date    CERVIX LESION DESTRUCTION  1998    Cryo    HYSTERECTOMY  09/15/2016    robotic total hyst with bilateral salpingo    TONSILLECTOMY      TUBAL LIGATION  03/2009     Family History   Problem Relation Age of Onset    Diabetes Mother     Stroke Mother     Heart Disease Mother     High Blood Pressure Mother     Diabetes Father     Stroke Father     Heart Disease Father      Current Outpatient Medications   Medication Sig Dispense Refill    sennosides-docusate sodium (SENOKOT-S) 8.6-50 MG tablet Take 2 tablets by mouth daily 20 tablet 0    Dulaglutide 3 MG/0.5ML SOPN Inject 3 mg into the skin once a week 4 Adjustable Dose Pre-filled Pen Syringe 3    famotidine (PEPCID) 20 MG tablet take 1 tablet by mouth twice a day 60 tablet 3    vitamin D (ERGOCALCIFEROL) 1.25 MG (93030 UT) CAPS capsule take 1 capsule by mouth every week 12 capsule 0    TRUE METRIX BLOOD GLUCOSE TEST strip use 1 TEST STRIP to TEST BLOOD SUGAR once daily 100 strip 1    metFORMIN (GLUCOPHAGE-XR) 500 MG extended release tablet Take 1 tablet by mouth in the morning and at bedtime 120 tablet 3    metroNIDAZOLE (METROGEL) 0.75 % gel

## 2023-07-27 DIAGNOSIS — L71.9 ACNE ROSACEA: ICD-10-CM

## 2023-07-27 DIAGNOSIS — E55.9 VITAMIN D DEFICIENCY: ICD-10-CM

## 2023-07-27 RX ORDER — ERGOCALCIFEROL 1.25 MG/1
CAPSULE ORAL
Qty: 12 CAPSULE | Refills: 0 | Status: SHIPPED | OUTPATIENT
Start: 2023-07-27

## 2023-07-27 RX ORDER — METRONIDAZOLE 7.5 MG/G
GEL TOPICAL
Qty: 45 G | Refills: 1 | Status: SHIPPED | OUTPATIENT
Start: 2023-07-27

## 2023-07-27 NOTE — TELEPHONE ENCOUNTER
Alex Sinha is calling to request a refill on the following medication(s):    Last Visit Date (If Applicable):  0/2/1833    Next Visit Date:    9/5/2023    Medication Request:  Requested Prescriptions     Pending Prescriptions Disp Refills    vitamin D (ERGOCALCIFEROL) 1.25 MG (87454 UT) CAPS capsule [Pharmacy Med Name: VITAMIN D2 1.25MG(50,000 UNIT)] 12 capsule 0     Sig: take 1 capsule by mouth every week

## 2023-09-06 ENCOUNTER — TELEPHONE (OUTPATIENT)
Dept: FAMILY MEDICINE CLINIC | Age: 43
End: 2023-09-06

## 2023-09-20 NOTE — TELEPHONE ENCOUNTER
Shasha Chakraborty is calling to request a refill on the following medication(s):    Last Visit Date (If Applicable):  7/8/4535    Next Visit Date:    Visit date not found    Medication Request:  Requested Prescriptions     Pending Prescriptions Disp Refills    famotidine (PEPCID) 20 MG tablet [Pharmacy Med Name: FAMOTIDINE 20 MG TABLET] 60 tablet 3     Sig: take 1 tablet by mouth twice a day

## 2023-09-21 RX ORDER — FAMOTIDINE 20 MG/1
TABLET, FILM COATED ORAL
Qty: 60 TABLET | Refills: 3 | Status: SHIPPED | OUTPATIENT
Start: 2023-09-21

## 2023-09-25 RX ORDER — DULAGLUTIDE 3 MG/.5ML
INJECTION, SOLUTION SUBCUTANEOUS
Qty: 2 ML | Refills: 1 | Status: SHIPPED | OUTPATIENT
Start: 2023-09-25

## 2023-10-26 DIAGNOSIS — E55.9 VITAMIN D DEFICIENCY: ICD-10-CM

## 2023-10-26 RX ORDER — ERGOCALCIFEROL 1.25 MG/1
CAPSULE ORAL
Qty: 12 CAPSULE | Refills: 0 | Status: SHIPPED | OUTPATIENT
Start: 2023-10-26

## 2024-07-30 ENCOUNTER — OFFICE VISIT (OUTPATIENT)
Dept: FAMILY MEDICINE CLINIC | Age: 44
End: 2024-07-30
Payer: COMMERCIAL

## 2024-07-30 ENCOUNTER — HOSPITAL ENCOUNTER (OUTPATIENT)
Age: 44
Setting detail: SPECIMEN
Discharge: HOME | End: 2024-07-30

## 2024-07-30 VITALS
WEIGHT: 210.2 LBS | SYSTOLIC BLOOD PRESSURE: 133 MMHG | TEMPERATURE: 97.3 F | HEIGHT: 66 IN | DIASTOLIC BLOOD PRESSURE: 89 MMHG | BODY MASS INDEX: 33.78 KG/M2 | OXYGEN SATURATION: 99 % | HEART RATE: 92 BPM

## 2024-07-30 DIAGNOSIS — Z12.31 ENCOUNTER FOR SCREENING MAMMOGRAM FOR MALIGNANT NEOPLASM OF BREAST: ICD-10-CM

## 2024-07-30 DIAGNOSIS — E55.9 VITAMIN D DEFICIENCY: ICD-10-CM

## 2024-07-30 DIAGNOSIS — Z12.4 ENCOUNTER FOR PAPANICOLAOU SMEAR FOR CERVICAL CANCER SCREENING: Primary | ICD-10-CM

## 2024-07-30 DIAGNOSIS — E66.9 TYPE 2 DIABETES MELLITUS WITH OBESITY (HCC): ICD-10-CM

## 2024-07-30 DIAGNOSIS — L71.9 ACNE ROSACEA: ICD-10-CM

## 2024-07-30 DIAGNOSIS — E11.69 TYPE 2 DIABETES MELLITUS WITH OBESITY (HCC): ICD-10-CM

## 2024-07-30 DIAGNOSIS — E11.9 CONTROLLED TYPE 2 DIABETES MELLITUS WITHOUT COMPLICATION, WITHOUT LONG-TERM CURRENT USE OF INSULIN (HCC): ICD-10-CM

## 2024-07-30 LAB — HBA1C MFR BLD: 11.9 %

## 2024-07-30 PROCEDURE — 83036 HEMOGLOBIN GLYCOSYLATED A1C: CPT | Performed by: FAMILY MEDICINE

## 2024-07-30 PROCEDURE — 99214 OFFICE O/P EST MOD 30 MIN: CPT | Performed by: FAMILY MEDICINE

## 2024-07-30 PROCEDURE — 99396 PREV VISIT EST AGE 40-64: CPT | Performed by: FAMILY MEDICINE

## 2024-07-30 RX ORDER — SIMVASTATIN 40 MG
40 TABLET ORAL NIGHTLY
Qty: 30 TABLET | Refills: 1 | Status: CANCELLED | OUTPATIENT
Start: 2024-07-30

## 2024-07-30 RX ORDER — ERGOCALCIFEROL 1.25 MG/1
50000 CAPSULE ORAL WEEKLY
Qty: 4 CAPSULE | Refills: 3 | Status: SHIPPED | OUTPATIENT
Start: 2024-07-30

## 2024-07-30 RX ORDER — METFORMIN HYDROCHLORIDE 500 MG/1
500 TABLET, EXTENDED RELEASE ORAL 2 TIMES DAILY
Qty: 120 TABLET | Refills: 3 | Status: SHIPPED | OUTPATIENT
Start: 2024-07-30

## 2024-07-30 RX ORDER — METRONIDAZOLE 7.5 MG/G
GEL TOPICAL
Qty: 45 G | Refills: 1 | Status: SHIPPED | OUTPATIENT
Start: 2024-07-30

## 2024-07-30 RX ORDER — FLUCONAZOLE 150 MG/1
150 TABLET ORAL
Qty: 2 TABLET | Refills: 0 | Status: SHIPPED | OUTPATIENT
Start: 2024-07-30 | End: 2024-08-05

## 2024-07-30 RX ORDER — DULAGLUTIDE 3 MG/.5ML
INJECTION, SOLUTION SUBCUTANEOUS
Qty: 2 ML | Refills: 1 | Status: CANCELLED | OUTPATIENT
Start: 2024-07-30

## 2024-07-30 RX ORDER — ATORVASTATIN CALCIUM 40 MG/1
40 TABLET, FILM COATED ORAL DAILY
Qty: 30 TABLET | Refills: 3 | Status: SHIPPED | OUTPATIENT
Start: 2024-07-30

## 2024-07-30 RX ORDER — LISINOPRIL 5 MG/1
2.5 TABLET ORAL DAILY
Qty: 15 TABLET | Refills: 3 | Status: SHIPPED | OUTPATIENT
Start: 2024-07-30

## 2024-07-30 SDOH — ECONOMIC STABILITY: FOOD INSECURITY: WITHIN THE PAST 12 MONTHS, THE FOOD YOU BOUGHT JUST DIDN'T LAST AND YOU DIDN'T HAVE MONEY TO GET MORE.: NEVER TRUE

## 2024-07-30 SDOH — ECONOMIC STABILITY: FOOD INSECURITY: WITHIN THE PAST 12 MONTHS, YOU WORRIED THAT YOUR FOOD WOULD RUN OUT BEFORE YOU GOT MONEY TO BUY MORE.: NEVER TRUE

## 2024-07-30 SDOH — ECONOMIC STABILITY: INCOME INSECURITY: HOW HARD IS IT FOR YOU TO PAY FOR THE VERY BASICS LIKE FOOD, HOUSING, MEDICAL CARE, AND HEATING?: NOT HARD AT ALL

## 2024-07-30 ASSESSMENT — PATIENT HEALTH QUESTIONNAIRE - PHQ9
2. FEELING DOWN, DEPRESSED OR HOPELESS: NOT AT ALL
SUM OF ALL RESPONSES TO PHQ QUESTIONS 1-9: 0
SUM OF ALL RESPONSES TO PHQ9 QUESTIONS 1 & 2: 0
SUM OF ALL RESPONSES TO PHQ QUESTIONS 1-9: 0
1. LITTLE INTEREST OR PLEASURE IN DOING THINGS: NOT AT ALL
SUM OF ALL RESPONSES TO PHQ QUESTIONS 1-9: 0
SUM OF ALL RESPONSES TO PHQ QUESTIONS 1-9: 0

## 2024-07-30 NOTE — PROGRESS NOTES
Subjective:      This 44 y.o. woman comes in today for pap smear only. Her most recent annual exam was on ??/2022. Her most recent Pap smear was on ?? and showed no abnormalities.  Previous abnormal Pap smears: no. Contraception: status post hysterectomy    Patient is also coming in today to have her A1c checked.  Last A1c in June 2023 was 7.1.  Patient states that she lost her insurance and she was not able to get any medications at all.    Objective:     /89   Pulse 92   Temp 97.3 °F (36.3 °C)   Ht 1.676 m (5' 5.98\")   Wt 95.3 kg (210 lb 3.2 oz)   LMP 08/04/2016 (Exact Date)   SpO2 99%   BMI 33.94 kg/m²   Pelvic Exam: cervix normal in appearance, exam obscured by obesity, external genitalia normal, no adnexal masses or tenderness, no bladder tenderness, no cervical motion tenderness, vagina normal without discharge. Pap smear obtained.    After discussing with patient that I will obtain the Pap smear she feels comfortable having the Pap smear done without a chaperone.    A1C: 11.7.    Assessment:     Amber was seen today for gynecologic exam.    Diagnoses and all orders for this visit:    Encounter for Papanicolaou smear for cervical cancer screening  -     PAP SMEAR; Future    Controlled type 2 diabetes mellitus without complication, without long-term current use of insulin (HCC)  -     POCT glycosylated hemoglobin (Hb A1C)  -     metFORMIN (GLUCOPHAGE-XR) 500 MG extended release tablet; Take 1 tablet by mouth in the morning and at bedtime  -     dulaglutide (TRULICITY) 0.75 MG/0.5ML SOPN SC injection; Inject 0.5 mLs into the skin every 7 days  -     atorvastatin (LIPITOR) 40 MG tablet; Take 1 tablet by mouth daily  -     [facility] JORI DIGITAL SCREEN BILATERAL; Future  -     CBC with Auto Differential; Future  -     Comprehensive Metabolic Panel; Future  -     Lipid Panel; Future  -     TSH with Reflex; Future    Type 2 diabetes mellitus with obesity (HCC)  -     lisinopril (PRINIVIL;ZESTRIL) 5 MG

## 2024-07-31 ENCOUNTER — HOSPITAL ENCOUNTER (OUTPATIENT)
Age: 44
Setting detail: SPECIMEN
Discharge: HOME | End: 2024-07-31

## 2024-07-31 DIAGNOSIS — E66.9 TYPE 2 DIABETES MELLITUS WITH OBESITY (HCC): ICD-10-CM

## 2024-07-31 DIAGNOSIS — E11.69 TYPE 2 DIABETES MELLITUS WITH OBESITY (HCC): ICD-10-CM

## 2024-07-31 DIAGNOSIS — E11.9 CONTROLLED TYPE 2 DIABETES MELLITUS WITHOUT COMPLICATION, WITHOUT LONG-TERM CURRENT USE OF INSULIN (HCC): ICD-10-CM

## 2024-07-31 LAB
ALBUMIN SERPL-MCNC: 4.1 G/DL (ref 3.5–5.2)
ALBUMIN/GLOB SERPL: 1 {RATIO} (ref 1–2.5)
ALP SERPL-CCNC: 74 U/L (ref 35–104)
ALT SERPL-CCNC: 29 U/L (ref 10–35)
ANION GAP SERPL CALCULATED.3IONS-SCNC: 10 MMOL/L (ref 9–16)
AST SERPL-CCNC: 24 U/L (ref 10–35)
BASOPHILS # BLD: 0.03 K/UL (ref 0–0.2)
BASOPHILS NFR BLD: 0 % (ref 0–2)
BILIRUB SERPL-MCNC: 0.5 MG/DL (ref 0–1.2)
BUN SERPL-MCNC: 10 MG/DL (ref 6–20)
CALCIUM SERPL-MCNC: 8.9 MG/DL (ref 8.6–10.4)
CHLORIDE SERPL-SCNC: 101 MMOL/L (ref 98–107)
CHOLEST SERPL-MCNC: 162 MG/DL (ref 0–199)
CHOLESTEROL/HDL RATIO: 3
CO2 SERPL-SCNC: 23 MMOL/L (ref 20–31)
CREAT SERPL-MCNC: 0.7 MG/DL (ref 0.5–0.9)
EOSINOPHIL # BLD: 0.09 K/UL (ref 0–0.44)
EOSINOPHILS RELATIVE PERCENT: 1 % (ref 1–4)
ERYTHROCYTE [DISTWIDTH] IN BLOOD BY AUTOMATED COUNT: 11.7 % (ref 11.8–14.4)
GFR, ESTIMATED: >90 ML/MIN/1.73M2
GLUCOSE SERPL-MCNC: 270 MG/DL (ref 74–99)
HCT VFR BLD AUTO: 36.8 % (ref 36.3–47.1)
HDLC SERPL-MCNC: 53 MG/DL
HGB BLD-MCNC: 12.7 G/DL (ref 11.9–15.1)
IMM GRANULOCYTES # BLD AUTO: 0.03 K/UL (ref 0–0.3)
IMM GRANULOCYTES NFR BLD: 0 %
LDLC SERPL CALC-MCNC: 88 MG/DL (ref 0–100)
LYMPHOCYTES NFR BLD: 2.64 K/UL (ref 1.1–3.7)
LYMPHOCYTES RELATIVE PERCENT: 36 % (ref 24–43)
MCH RBC QN AUTO: 32.1 PG (ref 25.2–33.5)
MCHC RBC AUTO-ENTMCNC: 34.5 G/DL (ref 28.4–34.8)
MCV RBC AUTO: 92.9 FL (ref 82.6–102.9)
MONOCYTES NFR BLD: 0.46 K/UL (ref 0.1–1.2)
MONOCYTES NFR BLD: 6 % (ref 3–12)
NEUTROPHILS NFR BLD: 57 % (ref 36–65)
NEUTS SEG NFR BLD: 4.13 K/UL (ref 1.5–8.1)
NRBC BLD-RTO: 0 PER 100 WBC
PLATELET # BLD AUTO: 265 K/UL (ref 138–453)
PMV BLD AUTO: 10.8 FL (ref 8.1–13.5)
POTASSIUM SERPL-SCNC: 3.9 MMOL/L (ref 3.7–5.3)
PROT SERPL-MCNC: 7.9 G/DL (ref 6.6–8.7)
RBC # BLD AUTO: 3.96 M/UL (ref 3.95–5.11)
SODIUM SERPL-SCNC: 134 MMOL/L (ref 136–145)
TRIGL SERPL-MCNC: 104 MG/DL
TSH SERPL DL<=0.05 MIU/L-ACNC: 0.99 UIU/ML (ref 0.27–4.2)
VLDLC SERPL CALC-MCNC: 21 MG/DL
WBC OTHER # BLD: 7.4 K/UL (ref 3.5–11.3)

## 2024-08-04 LAB — CYTOLOGY REPORT: NORMAL

## 2024-08-05 ENCOUNTER — PATIENT MESSAGE (OUTPATIENT)
Dept: FAMILY MEDICINE CLINIC | Age: 44
End: 2024-08-05

## 2024-08-05 DIAGNOSIS — N76.0 BV (BACTERIAL VAGINOSIS): Primary | ICD-10-CM

## 2024-08-05 DIAGNOSIS — B96.89 BV (BACTERIAL VAGINOSIS): Primary | ICD-10-CM

## 2024-08-05 NOTE — TELEPHONE ENCOUNTER
From: CRISTY RICCI  To: Amber Westbrook  Sent: 8/5/2024 8:36 AM EDT  Subject: result    Dr PACHECO: \"Pap smear with normal limits. Possible bacterial vaginosis present.   Any vaginal discharge? Thank you.\"

## 2024-08-06 RX ORDER — METRONIDAZOLE 500 MG/1
500 TABLET ORAL 2 TIMES DAILY
Qty: 14 TABLET | Refills: 0 | Status: SHIPPED | OUTPATIENT
Start: 2024-08-06 | End: 2024-08-13

## 2024-08-30 RX ORDER — FLUCONAZOLE 150 MG/1
150 TABLET ORAL
Qty: 2 TABLET | Refills: 0 | Status: SHIPPED | OUTPATIENT
Start: 2024-08-30 | End: 2024-09-05

## 2024-09-10 DIAGNOSIS — E66.9 TYPE 2 DIABETES MELLITUS WITH OBESITY (HCC): Primary | ICD-10-CM

## 2024-09-10 DIAGNOSIS — E11.69 TYPE 2 DIABETES MELLITUS WITH OBESITY (HCC): Primary | ICD-10-CM

## 2024-10-01 RX ORDER — FLUCONAZOLE 150 MG/1
150 TABLET ORAL
Qty: 2 TABLET | Refills: 0 | Status: SHIPPED | OUTPATIENT
Start: 2024-10-01 | End: 2024-10-07

## 2024-10-01 NOTE — TELEPHONE ENCOUNTER
Amber Westbrook is calling to request a refill on the following medication(s):    Last Visit Date (If Applicable):  7/30/2024    Next Visit Date:    10/30/2024    Medication Request:  Requested Prescriptions     Pending Prescriptions Disp Refills    fluconazole (DIFLUCAN) 150 MG tablet [Pharmacy Med Name: FLUCONAZOLE 150 MG TABLET] 2 tablet 0     Sig: TAKE 1 TABLET BY MOUTH EVERY 72 HOURS FOR 6 DAYS

## 2024-12-02 ENCOUNTER — OFFICE VISIT (OUTPATIENT)
Dept: FAMILY MEDICINE CLINIC | Age: 44
End: 2024-12-02
Payer: COMMERCIAL

## 2024-12-02 VITALS
BODY MASS INDEX: 34.35 KG/M2 | SYSTOLIC BLOOD PRESSURE: 120 MMHG | OXYGEN SATURATION: 99 % | DIASTOLIC BLOOD PRESSURE: 84 MMHG | TEMPERATURE: 97.7 F | HEART RATE: 78 BPM | WEIGHT: 212.7 LBS

## 2024-12-02 DIAGNOSIS — Z12.31 ENCOUNTER FOR SCREENING MAMMOGRAM FOR MALIGNANT NEOPLASM OF BREAST: ICD-10-CM

## 2024-12-02 DIAGNOSIS — M54.32 LEFT SCIATIC NERVE PAIN: ICD-10-CM

## 2024-12-02 DIAGNOSIS — B96.89 BV (BACTERIAL VAGINOSIS): ICD-10-CM

## 2024-12-02 DIAGNOSIS — E11.69 TYPE 2 DIABETES MELLITUS WITH OBESITY (HCC): Primary | ICD-10-CM

## 2024-12-02 DIAGNOSIS — E66.9 TYPE 2 DIABETES MELLITUS WITH OBESITY (HCC): Primary | ICD-10-CM

## 2024-12-02 DIAGNOSIS — N76.0 BV (BACTERIAL VAGINOSIS): ICD-10-CM

## 2024-12-02 LAB — HBA1C MFR BLD: 9.5 %

## 2024-12-02 PROCEDURE — 83036 HEMOGLOBIN GLYCOSYLATED A1C: CPT | Performed by: FAMILY MEDICINE

## 2024-12-02 PROCEDURE — 99214 OFFICE O/P EST MOD 30 MIN: CPT | Performed by: FAMILY MEDICINE

## 2024-12-02 PROCEDURE — 3046F HEMOGLOBIN A1C LEVEL >9.0%: CPT | Performed by: FAMILY MEDICINE

## 2024-12-02 RX ORDER — CALCIUM CITRATE/VITAMIN D3 200MG-6.25
1 TABLET ORAL 3 TIMES DAILY
Qty: 100 STRIP | Refills: 5 | Status: SHIPPED | OUTPATIENT
Start: 2024-12-02

## 2024-12-02 RX ORDER — MELOXICAM 7.5 MG/1
7.5 TABLET ORAL DAILY
Qty: 30 TABLET | Refills: 3 | Status: SHIPPED | OUTPATIENT
Start: 2024-12-02

## 2024-12-02 RX ORDER — GLUCOSAM/CHON-MSM1/C/MANG/BOSW 500-416.6
1 TABLET ORAL 3 TIMES DAILY
Qty: 100 EACH | Refills: 2 | Status: SHIPPED | OUTPATIENT
Start: 2024-12-02

## 2024-12-02 NOTE — PROGRESS NOTES
General FM note    Amber Westbrook is a 44 y.o. female who presents today for follow up on her  medical conditions as noted below.  Amber Westbrook is c/o of   Chief Complaint   Patient presents with    Diabetes       Patient Active Problem List:     Dysmenorrhea     AUB-N     Adenomyosis     s/p RALH w/BS     Uncontrolled type 2 diabetes mellitus without complication, with long-term current use of insulin     Vaginal yeast infection     Family history of breast cancer     Family history of ovarian cancer     Past Medical History:   Diagnosis Date    DM (diabetes mellitus) ([facility]) 2016    DUB (dysfunctional uterine bleeding)     adenomyosis    H/O menorrhagia     History of blood transfusion     no reaction    Hx of blood clots     2001 left leg    Vitamin D deficiency       Past Surgical History:   Procedure Laterality Date    CERVIX LESION DESTRUCTION  1998    Cryo    HYSTERECTOMY  09/15/2016    robotic total hyst with bilateral salpingo    TONSILLECTOMY      TUBAL LIGATION  03/2009     Family History   Problem Relation Age of Onset    Diabetes Mother     Stroke Mother     Heart Disease Mother     High Blood Pressure Mother     Diabetes Father     Stroke Father     Heart Disease Father      Current Outpatient Medications   Medication Sig Dispense Refill    TRUEplus Lancets 30G MISC Inject 1 each into the skin 3 times daily 100 each 2    blood glucose test strips (TRUE METRIX BLOOD GLUCOSE TEST) strip Inject 1 each into the skin 3 times daily As needed. 100 strip 5    meloxicam (MOBIC) 7.5 MG tablet Take 1 tablet by mouth daily 30 tablet 3    metFORMIN (GLUCOPHAGE) 1000 MG tablet Take 1 tablet by mouth 2 times daily (with meals) 60 tablet 5    lisinopril (PRINIVIL;ZESTRIL) 5 MG tablet Take 0.5 tablets by mouth daily 15 tablet 3    metroNIDAZOLE (METROGEL) 0.75 % gel Apply topically 2 times daily. 45 g 1    dulaglutide (TRULICITY) 0.75 MG/0.5ML SOPN SC injection Inject 0.5 mLs into the skin every 7 days 4 Adjustable

## 2024-12-03 RX ORDER — FLUCONAZOLE 150 MG/1
150 TABLET ORAL
Qty: 2 TABLET | Refills: 0 | Status: SHIPPED | OUTPATIENT
Start: 2024-12-03 | End: 2024-12-09

## 2024-12-03 RX ORDER — METRONIDAZOLE 500 MG/1
500 TABLET ORAL 2 TIMES DAILY
Qty: 14 TABLET | Refills: 0 | Status: SHIPPED | OUTPATIENT
Start: 2024-12-03 | End: 2024-12-10

## 2024-12-04 ENCOUNTER — HOSPITAL ENCOUNTER (OUTPATIENT)
Dept: PHYSICAL THERAPY | Facility: CLINIC | Age: 44
Setting detail: THERAPIES SERIES
Discharge: HOME | End: 2024-12-04

## 2024-12-04 NOTE — FLOWSHEET NOTE
[] [facility]  Outpatient Rehabilitation &  Therapy  [address]  [phone]  [phone] [x] [facility]  Outpatient Rehabilitation &  Therapy  [address]  P: [phone]  F: [phone] [] [facility]  Outpatient Rehabilitation &  Therapy  [address]  P: [phone]  F: [phone] [] [facility]  Outpatient Rehabilitation &  Therapy  [address]  [phone]  [phone] [] [facility]  Outpatient Rehabilitation &  Therapy  [address]   P: [phone]  F: [phone]  [] [facility]  Outpatient Rehabilitation &  Therapy  [address]  P: [phone]  F: [phone] [] [facility]  Outpatient Rehabilitation &  Therapy  [address]  P: [phone]  F: [phone] [] [facility]  Outpatient Rehabilitation &  Therapy  [address]  P: [phone]  F: [phone] [] [facility]  Outpatient Rehabilitation &  Therapy  [address]  P: [phone]  F: [phone]  [] [facility] Outpatient Rehabilitation &  Therapy  [address]  P: [phone]  F: [phone]     Therapy Cancel/No Show note    Date: 2024  Patient: Amber Westbrook  : 1980  MRN: [id number]    Cancels/No Shows to date: 0    For today's appointment patient:    [x]  Cancelled    [] Rescheduled appointment    [] No-show     Reason given by patient:    []  Patient ill    []  Conflicting appointment    [] No transportation      [] Conflict with work    [] No reason given    [] Weather related    [] COVID-19    [x] Other:      Comments:  Pt cancelled initial eval secondary to financial concern.      [] Next appointment was confirmed    Electronically signed by: Josy Plummer PT

## 2024-12-24 ENCOUNTER — TELEPHONE (OUTPATIENT)
Dept: FAMILY MEDICINE CLINIC | Age: 44
End: 2024-12-24

## 2024-12-24 NOTE — TELEPHONE ENCOUNTER
Medication Management Service    Date: 12/24/2024  Patient's Name: Amber Westbrook YOB: 1980            _____________________________________________________________________________________________    Amber Westbrook is a 44 y.o. female referred to ambulatory pharmacy by Karen Dixon MD for diabetes management. Patient's A1c has been consistently >7%. Patient is currently prescribed Trulicity, metformin. In addition, patient is current prescribed atorvastatin and lisinopril. Per chart review, patient has previously been on Mounjaro, Farxiga, glyburide, Novolog, Januvia, Ozempic prior to current therapy.    Lab Results   Component Value Date    LABA1C 9.5 12/02/2024    LABA1C 11.9 07/30/2024    LABA1C 7.1 (H) 06/01/2023       Left message to discuss referral. Will continue to outreach as appropriate.    Reinaldo Martinez, DionD, [facility]  Ambulatory Clinical Pharmacist   [facility] Specialty Medication Service  Phone: [phone]  [facility] Medicine  Phone: [phone] option 1    For Pharmacy Admin Tracking Only    Program: Medical Group  CPA in place:  No  Recommendation Provided To: Patient/Caregiver: 1 via Telephone  Intervention Detail: Scheduled Appointment  Intervention Accepted By: Patient/Caregiver: 0  Gap Closed?: No   Time Spent (min): 15

## 2024-12-25 DIAGNOSIS — E11.69 TYPE 2 DIABETES MELLITUS WITH OBESITY (HCC): ICD-10-CM

## 2024-12-25 DIAGNOSIS — E66.9 TYPE 2 DIABETES MELLITUS WITH OBESITY (HCC): ICD-10-CM

## 2024-12-26 DIAGNOSIS — E11.69 TYPE 2 DIABETES MELLITUS WITH OBESITY (HCC): Primary | ICD-10-CM

## 2024-12-26 DIAGNOSIS — E66.9 TYPE 2 DIABETES MELLITUS WITH OBESITY (HCC): Primary | ICD-10-CM

## 2024-12-26 RX ORDER — DULAGLUTIDE 1.5 MG/.5ML
1.5 INJECTION, SOLUTION SUBCUTANEOUS WEEKLY
Qty: 4 ADJUSTABLE DOSE PRE-FILLED PEN SYRINGE | Refills: 1 | Status: SHIPPED | OUTPATIENT
Start: 2024-12-26

## 2024-12-26 RX ORDER — ERGOCALCIFEROL 1.25 MG/1
50000 CAPSULE, LIQUID FILLED ORAL WEEKLY
Qty: 4 CAPSULE | Refills: 3 | Status: SHIPPED | OUTPATIENT
Start: 2024-12-26

## 2024-12-26 RX ORDER — LISINOPRIL 5 MG/1
2.5 TABLET ORAL DAILY
Qty: 15 TABLET | Refills: 3 | Status: SHIPPED | OUTPATIENT
Start: 2024-12-26

## 2025-01-02 ENCOUNTER — TELEPHONE (OUTPATIENT)
Dept: FAMILY MEDICINE CLINIC | Age: 45
End: 2025-01-02

## 2025-01-02 NOTE — TELEPHONE ENCOUNTER
Medication Management Service    Date: 1/2/2025  Patient's Name: Amber Westbrook YOB: 1980            _____________________________________________________________________________________________    Reached patient to schedule appointment for DM management. Patient scheduled 1/9/2024 .    Reinaldo Martinez PharmD, [facility]  Ambulatory Clinical Pharmacist   [facility] Specialty Medication Service  Phone: [phone]  [facility] Medicine  Phone: [phone] option 1    For Pharmacy Admin Tracking Only    Program: Medical Group  CPA in place:  No  Recommendation Provided To: Patient/Caregiver: 1 via Telephone  Intervention Detail: Scheduled Appointment  Intervention Accepted By: Patient/Caregiver: 1  Gap Closed?: No   Time Spent (min): 15

## 2025-01-09 ENCOUNTER — PHARMACY VISIT (OUTPATIENT)
Dept: FAMILY MEDICINE CLINIC | Age: 45
End: 2025-01-09

## 2025-01-09 ENCOUNTER — HOSPITAL ENCOUNTER (OUTPATIENT)
Age: 45
Setting detail: SPECIMEN
Discharge: HOME | End: 2025-01-09

## 2025-01-09 VITALS
HEART RATE: 74 BPM | DIASTOLIC BLOOD PRESSURE: 85 MMHG | WEIGHT: 206.4 LBS | BODY MASS INDEX: 33.33 KG/M2 | SYSTOLIC BLOOD PRESSURE: 119 MMHG

## 2025-01-09 DIAGNOSIS — E66.9 TYPE 2 DIABETES MELLITUS WITH OBESITY (HCC): Primary | ICD-10-CM

## 2025-01-09 DIAGNOSIS — E11.69 TYPE 2 DIABETES MELLITUS WITH OBESITY (HCC): ICD-10-CM

## 2025-01-09 DIAGNOSIS — E11.69 TYPE 2 DIABETES MELLITUS WITH OBESITY (HCC): Primary | ICD-10-CM

## 2025-01-09 DIAGNOSIS — E66.9 TYPE 2 DIABETES MELLITUS WITH OBESITY (HCC): ICD-10-CM

## 2025-01-09 LAB
CREAT UR-MCNC: 239 MG/DL (ref 28–217)
MICROALBUMIN UR-MCNC: <12 MG/L (ref 0–20)
MICROALBUMIN/CREAT UR-RTO: ABNORMAL MCG/MG CREAT (ref 0–25)

## 2025-01-09 NOTE — PROGRESS NOTES
Clinical Pharmacy Note    Amber Westbrook is a 44 y.o. female, referred to Clinical Pharmacy for medication management by Karen Dixon MD. Amber Westbrook was seen today for diabetes management.     110-190  Allergies   Allergen Reactions    Codeine Hives and Itching    Keflex [Cephalexin] Swelling    Penicillins Hives    Vicodin [Hydrocodone-Acetaminophen] Itching        Past Medical History:   Diagnosis Date    DM (diabetes mellitus) ([facility]) 2016    DUB (dysfunctional uterine bleeding)     adenomyosis    H/O menorrhagia     History of blood transfusion     no reaction    Hx of blood clots     2001 left leg    Vitamin D deficiency       Social History     Tobacco Use    Smoking status: Never    Smokeless tobacco: Never   Substance Use Topics    Alcohol use: Yes     Comment: rarely      Family History   Problem Relation Age of Onset    Diabetes Mother     Stroke Mother     Heart Disease Mother     High Blood Pressure Mother     Diabetes Father     Stroke Father     Heart Disease Father        REVIEW OF CURRENT DISEASE STATE  Diabetes Mellitus: Patient here for an evaluation of Type 2 diabetes mellitus.  Current symptoms/problems include none and have been worsening. Symptoms have been present for several months.    DIABETES MEDICATIONS  Current Therapy: Trulicity and metformin    Previous Therapy: Mounjaro, Farxiga, glyburide, Novolog, Januvia, Ozempic     MEDICATIONS  Current Outpatient Medications   Medication Sig Dispense Refill    lisinopril (PRINIVIL;ZESTRIL) 5 MG tablet TAKE 1/2 TABLET BY MOUTH DAILY 15 tablet 3    vitamin D (ERGOCALCIFEROL) 1.25 MG (10195 UT) CAPS capsule TAKE 1 CAPSULE BY MOUTH ONCE WEEKLY 4 capsule 3    dulaglutide (TRULICITY) 1.5 MG/0.5ML SC injection Inject 0.5 mLs into the skin once a week 4 Adjustable Dose Pre-filled Pen Syringe 1    meloxicam (MOBIC) 7.5 MG tablet Take 1 tablet by mouth daily 30 tablet 3    metFORMIN (GLUCOPHAGE) 1000 MG tablet Take 1 tablet by mouth 2 times daily

## 2025-01-13 RX ORDER — FLUTICASONE PROPIONATE 50 MCG
1 SPRAY, SUSPENSION (ML) NASAL DAILY
Qty: 1 EACH | Refills: 3 | Status: SHIPPED | OUTPATIENT
Start: 2025-01-13

## 2025-01-13 RX ORDER — SENNA AND DOCUSATE SODIUM 50; 8.6 MG/1; MG/1
1 TABLET, FILM COATED ORAL DAILY
Qty: 30 TABLET | Refills: 0 | Status: SHIPPED | OUTPATIENT
Start: 2025-01-13

## 2025-01-27 DIAGNOSIS — E11.9 CONTROLLED TYPE 2 DIABETES MELLITUS WITHOUT COMPLICATION, WITHOUT LONG-TERM CURRENT USE OF INSULIN (HCC): ICD-10-CM

## 2025-01-27 DIAGNOSIS — L71.9 ACNE ROSACEA: ICD-10-CM

## 2025-01-28 RX ORDER — ATORVASTATIN CALCIUM 40 MG/1
40 TABLET, FILM COATED ORAL DAILY
Qty: 30 TABLET | Refills: 3 | Status: SHIPPED | OUTPATIENT
Start: 2025-01-28

## 2025-01-28 RX ORDER — METRONIDAZOLE 7.5 MG/G
GEL TOPICAL
Qty: 45 G | Refills: 1 | Status: SHIPPED | OUTPATIENT
Start: 2025-01-28

## 2025-02-19 ENCOUNTER — PATIENT MESSAGE (OUTPATIENT)
Dept: FAMILY MEDICINE CLINIC | Age: 45
End: 2025-02-19

## 2025-02-19 DIAGNOSIS — E11.69 TYPE 2 DIABETES MELLITUS WITH OBESITY (HCC): Primary | ICD-10-CM

## 2025-02-19 DIAGNOSIS — E66.9 TYPE 2 DIABETES MELLITUS WITH OBESITY (HCC): Primary | ICD-10-CM

## 2025-03-03 ENCOUNTER — OFFICE VISIT (OUTPATIENT)
Dept: FAMILY MEDICINE CLINIC | Age: 45
End: 2025-03-03
Payer: COMMERCIAL

## 2025-03-03 VITALS
DIASTOLIC BLOOD PRESSURE: 85 MMHG | BODY MASS INDEX: 32.94 KG/M2 | TEMPERATURE: 97.2 F | OXYGEN SATURATION: 99 % | SYSTOLIC BLOOD PRESSURE: 115 MMHG | WEIGHT: 204 LBS | HEART RATE: 78 BPM

## 2025-03-03 DIAGNOSIS — E66.9 TYPE 2 DIABETES MELLITUS WITH OBESITY (HCC): Primary | ICD-10-CM

## 2025-03-03 DIAGNOSIS — Z12.31 ENCOUNTER FOR SCREENING MAMMOGRAM FOR MALIGNANT NEOPLASM OF BREAST: ICD-10-CM

## 2025-03-03 DIAGNOSIS — E11.69 TYPE 2 DIABETES MELLITUS WITH OBESITY (HCC): Primary | ICD-10-CM

## 2025-03-03 LAB — HBA1C MFR BLD: 7.4 %

## 2025-03-03 PROCEDURE — 99213 OFFICE O/P EST LOW 20 MIN: CPT | Performed by: FAMILY MEDICINE

## 2025-03-03 PROCEDURE — 83036 HEMOGLOBIN GLYCOSYLATED A1C: CPT | Performed by: FAMILY MEDICINE

## 2025-03-03 PROCEDURE — 3051F HG A1C>EQUAL 7.0%<8.0%: CPT | Performed by: FAMILY MEDICINE

## 2025-03-03 SDOH — ECONOMIC STABILITY: FOOD INSECURITY: WITHIN THE PAST 12 MONTHS, THE FOOD YOU BOUGHT JUST DIDN'T LAST AND YOU DIDN'T HAVE MONEY TO GET MORE.: PATIENT DECLINED

## 2025-03-03 SDOH — ECONOMIC STABILITY: FOOD INSECURITY: WITHIN THE PAST 12 MONTHS, YOU WORRIED THAT YOUR FOOD WOULD RUN OUT BEFORE YOU GOT MONEY TO BUY MORE.: PATIENT DECLINED

## 2025-03-03 ASSESSMENT — PATIENT HEALTH QUESTIONNAIRE - PHQ9
SUM OF ALL RESPONSES TO PHQ QUESTIONS 1-9: 0
2. FEELING DOWN, DEPRESSED OR HOPELESS: NOT AT ALL
1. LITTLE INTEREST OR PLEASURE IN DOING THINGS: NOT AT ALL
SUM OF ALL RESPONSES TO PHQ QUESTIONS 1-9: 0

## 2025-03-03 NOTE — PROGRESS NOTES
Placed This Encounter   Procedures    BRANDI JORI DIGITAL SCREEN BILATERAL     Standing Status:   Future     Standing Expiration Date:   5/3/2026     Scheduling Instructions:      May perform additional studies at the discretion of the radiologist: Breast US, breast MRI, imaging guided biopsy, cyst aspiration or MBI.    POCT glycosylated hemoglobin (Hb A1C)     No orders of the defined types were placed in this encounter.      Call or return to clinic prn if these symptoms worsen or fail to improve as anticipated.  I have reviewed the instructions with the patient, answering all questions to patient's satisfaction.      Amber received counseling on the following healthy behaviors: nutrition, exercise, and medication adherence  Reviewed prior labs and health maintenance.  Continue current medications, diet and exercise.  Discussed use, benefit, and side effects of prescribed medications. Barriers to medication compliance addressed.   Patient given educational materials - see patient instructions.    All patient questions answered.  Patient voiced understanding.      Electronically signed by Karen Dixon MD on 3/3/2025 at 9:48 AM       (Please note that portions of this note were completed with a voice recognition program. Efforts were made to edit the dictations but occasionally words are mis-transcribed.)    The patient (or guardian, if applicable) and other individuals in attendance with the patient were advised that Artificial Intelligence will be utilized during this visit to record, process the conversation to generate a clinical note, and support improvement of the AI technology. The patient (or guardian, if applicable) and other individuals in attendance at the appointment consented to the use of AI, including the recording.

## 2025-03-31 DIAGNOSIS — E66.9 TYPE 2 DIABETES MELLITUS WITH OBESITY (HCC): ICD-10-CM

## 2025-03-31 DIAGNOSIS — E11.69 TYPE 2 DIABETES MELLITUS WITH OBESITY (HCC): ICD-10-CM

## 2025-03-31 RX ORDER — DULAGLUTIDE 4.5 MG/.5ML
INJECTION, SOLUTION SUBCUTANEOUS
Qty: 2 ML | Refills: 0 | Status: SHIPPED | OUTPATIENT
Start: 2025-03-31

## 2025-04-08 ENCOUNTER — TELEPHONE (OUTPATIENT)
Dept: FAMILY MEDICINE CLINIC | Age: 45
End: 2025-04-08

## 2025-04-08 NOTE — TELEPHONE ENCOUNTER
Medication Management Service    Date: 4/8/2025  Patient's Name: Amber Westbrook YOB: 1980            _____________________________________________________________________________________________    Patient was last seen by clinical pharmacy 1/9/2025. Patient has not returned phone calls to reschedule appointment. Will discharge from clinical services at this time.    Reinaldo Martinez, DionD, [facility]  Ambulatory Clinical Pharmacist   [facility] Specialty Medication Service  Phone: [phone]  [facility] Medicine  Phone: [phone] option 1    For Pharmacy Admin Tracking Only    Program: Medical Group  CPA in place:  Yes  Time Spent (min): 15

## 2025-05-05 DIAGNOSIS — B96.89 BV (BACTERIAL VAGINOSIS): ICD-10-CM

## 2025-05-05 DIAGNOSIS — N76.0 BV (BACTERIAL VAGINOSIS): ICD-10-CM

## 2025-05-06 RX ORDER — FLUCONAZOLE 150 MG/1
150 TABLET ORAL
Qty: 2 TABLET | Refills: 0 | Status: SHIPPED | OUTPATIENT
Start: 2025-05-06 | End: 2025-05-12

## 2025-05-06 RX ORDER — METRONIDAZOLE 500 MG/1
500 TABLET ORAL 2 TIMES DAILY
Qty: 14 TABLET | Refills: 0 | Status: SHIPPED | OUTPATIENT
Start: 2025-05-06 | End: 2025-05-13

## 2025-05-16 ENCOUNTER — TELEPHONE (OUTPATIENT)
Dept: FAMILY MEDICINE CLINIC | Age: 45
End: 2025-05-16

## 2025-05-16 DIAGNOSIS — E66.9 TYPE 2 DIABETES MELLITUS WITH OBESITY (HCC): Primary | ICD-10-CM

## 2025-05-16 DIAGNOSIS — E11.69 TYPE 2 DIABETES MELLITUS WITH OBESITY (HCC): Primary | ICD-10-CM

## 2025-05-16 RX ORDER — LIRAGLUTIDE 6 MG/ML
0.6 INJECTION SUBCUTANEOUS DAILY
Qty: 3 ML | Refills: 0 | Status: SHIPPED | OUTPATIENT
Start: 2025-05-16

## 2025-05-16 NOTE — TELEPHONE ENCOUNTER
Pharmacy recommended Jardiance or Liraglutide in place of Trulicity.  Most likely no PA required.  Pt self pay.

## 2025-05-16 NOTE — TELEPHONE ENCOUNTER
Insurance listed UMR has elapsed?  Cover my meds message \"cannot find pt listed\"  Also listed in chart as self pay. ?

## 2025-05-16 NOTE — TELEPHONE ENCOUNTER
I did call in the Victoza in set of the Trulicity.  The Victoza is a daily injection.  Symptoms are as the Trulicity even side effects are similar.  The patient needs to keep me up-to-date every month as this will be increased and can be increased every month.  If there are any questions let me know.

## 2025-07-19 DIAGNOSIS — L71.9 ACNE ROSACEA: ICD-10-CM

## 2025-07-19 DIAGNOSIS — M54.32 LEFT SCIATIC NERVE PAIN: ICD-10-CM

## 2025-07-21 RX ORDER — METRONIDAZOLE TOPICAL 7.5 MG/G
GEL TOPICAL
Qty: 45 G | Refills: 1 | Status: SHIPPED | OUTPATIENT
Start: 2025-07-21

## 2025-07-21 RX ORDER — MELOXICAM 7.5 MG/1
7.5 TABLET ORAL DAILY
Qty: 30 TABLET | Refills: 3 | Status: SHIPPED | OUTPATIENT
Start: 2025-07-21

## 2025-07-22 DIAGNOSIS — E11.9 CONTROLLED TYPE 2 DIABETES MELLITUS WITHOUT COMPLICATION, WITHOUT LONG-TERM CURRENT USE OF INSULIN (HCC): ICD-10-CM

## 2025-07-22 RX ORDER — ATORVASTATIN CALCIUM 40 MG/1
40 TABLET, FILM COATED ORAL DAILY
Qty: 90 TABLET | Refills: 0 | Status: SHIPPED | OUTPATIENT
Start: 2025-07-22

## 2025-07-22 NOTE — TELEPHONE ENCOUNTER
Amber Westbrook is calling to request a refill on the following medication(s):    Last Visit Date (If Applicable):  3/3/2025    No Show: 06/03/2025  Cancelled: 06/02/2025    Next Visit Date:    Visit date not found    Medication Request:  Requested Prescriptions     Pending Prescriptions Disp Refills    atorvastatin (LIPITOR) 40 MG tablet [Pharmacy Med Name: ATORVASTATIN 40 MG TABLET] 90 tablet 0     Sig: TAKE 1 TABLET BY MOUTH DAILY